# Patient Record
Sex: MALE | Race: WHITE | Employment: OTHER | URBAN - METROPOLITAN AREA
[De-identification: names, ages, dates, MRNs, and addresses within clinical notes are randomized per-mention and may not be internally consistent; named-entity substitution may affect disease eponyms.]

---

## 2017-08-31 ENCOUNTER — HOSPITAL ENCOUNTER (EMERGENCY)
Facility: CLINIC | Age: 62
Discharge: HOME | End: 2017-09-01
Attending: EMERGENCY MEDICINE | Admitting: EMERGENCY MEDICINE
Payer: COMMERCIAL

## 2017-08-31 DIAGNOSIS — R33.9 URINARY RETENTION: ICD-10-CM

## 2017-08-31 PROCEDURE — 51702 INSERT TEMP BLADDER CATH: CPT

## 2017-08-31 PROCEDURE — 99283 EMERGENCY DEPT VISIT LOW MDM: CPT

## 2017-08-31 NOTE — ED AVS SNAPSHOT
Emergency Department    [address] [facility] [address]    Phone:  [phone]    Fax:  [phone]                                       Trevon Nice   MRN: [id number]    Department:   Emergency Department   Date of Visit:  8/31/2017           Patient Information     Date Of Birth          1955        Your diagnoses for this visit were:     Urinary retention        You were seen by Gino Chan DO.      Follow-up Information     Follow up with Urologist In 5 days.    Why:  Call tomorrow for appointment        Follow up with  Emergency Department.    Specialty:  EMERGENCY MEDICINE    Why:  If symptoms worsen    Contact information:    [address] [facility] [address] [phone]        Discharge Instructions         Urinary Retention (Male)  Urinary retention is the medical term for difficulty or inability to pass urine, even though your bladder is full.  Causes  The most common cause of urinary retention in men is the bladder outlet being blocked. This can be due to an enlarged prostate gland or a bladder infection. Certain medicines can also cause this problem. This condition is more likely to occur as men get older.    This condition is treated by insertion of a catheter into the bladder to drain the urine. This provides immediate relief. The catheter may need to remain in place for a few days to prevent a recurrence. The catheter has a balloon on the tip which was inflated after insertion. This prevents the catheter from falling out.  Symptoms  Common symptoms of urinary retention include:    Pain (not experienced by everyone)    Frequent urination    Feeling that the bladder is still full after urinating    Incontinence (not being able to control the release of urine)    Swollen abdomen  Treatment  This condition is treated by inserting a tube (catheter) into the bladder to drain the urine. This provides immediate relief. The catheter  may need to stay in place for a few days. The catheter has a balloon on the tip, which is inflated after insertion. This prevents the catheter from falling out.  Home care    If you were given antibiotics, take them until they are used up, or your healthcare provider tells you to stop. It is important to finish the antibiotics even though you feel better. This is to make sure your infection has cleared.    If a catheter was left in place, it is important to keep bacteria from getting into the collection bag. Do not disconnect the catheter from the collection bag.    Use a leg band to secure the drainage tube, so it does not pull on the catheter. Drain the collection bag when it becomes full using the drain spout at the bottom of the bag.    Do not pull on or try to remove your catheter. This will injure your urethra. The catheter must be removed by a healthcare provider.  Follow-up care  Follow up with your healthcare provider, or as advised.  If a catheter was left in place, it can usually be removed within 3 to 7 days. Some conditions require the catheter to stay in longer. Your healthcare provider will tell you when to return to have the catheter removed.  When to seek medical advice  Call your healthcare provider right away if any of these occur:    Fever of 100.4 F (38 C) or higher, or as directed by your healthcare provider    Bladder or lower-abdominal pain or fullness    Abdominal swelling, nausea, vomiting, or back pain    Blood or urine leakage around the catheter    Bloody urine coming from the catheter (if a new symptom)    Weakness, dizziness, or fainting    Confusion or change in usual level of alertness    If a catheter was left in place, return if:    Catheter falls out    Catheter stops draining for 6 hours  Date Last Reviewed: 7/26/2015 2000-2017 [employer]. [address] [place] [address]. All rights reserved. This information is not intended as a substitute for  professional medical care. Always follow your healthcare professional's instructions.          24 Hour Appointment Hotline       To make an appointment at any [facility], call [phone] ([phone]). If you don't have a family doctor or clinic, we will help you find one. [place] clinics are conveniently located to serve the needs of you and your family.             Review of your medicines      Our records show that you are taking the medicines listed below. If these are incorrect, please call your family doctor or clinic.        Dose / Directions Last dose taken    METOPROLOL TARTRATE PO   Dose:  25 mg        Take 25 mg by mouth   Refills:  0        TAMBOCOR PO   Dose:  50 mg        Take 50 mg by mouth   Refills:  0        TAMSULOSIN HCL PO   Dose:  0.4 mg        Take 0.4 mg by mouth   Refills:  0                Procedures and tests performed during your visit     Bladder scan    UA reflex to Microscopic      Orders Needing Specimen Collection     None      Pending Results     No orders found for last 3 day(s).            Pending Culture Results     No orders found for last 3 day(s).            Pending Results Instructions     If you had any lab results that were not finalized at the time of your Discharge, you can call the ED Lab Result RN at [phone]. You will be contacted by this team for any positive Lab results or changes in treatment. The nurses are available 7 days a week from 10A to 6:30P.  You can leave a message 24 hours per day and they will return your call.        Test Results From Your Hospital Stay        9/1/2017 12:25 AM      Component Results     Component Value Ref Range & Units Status    Color Urine Straw  Final    Appearance Urine Clear  Final    Glucose Urine Negative NEG^Negative mg/dL Final    Bilirubin Urine Negative NEG^Negative Final    Ketones Urine Negative NEG^Negative mg/dL Final    Specific Gravity Urine 1.002 (L) 1.003 - 1.035 Final    Blood Urine Trace (A)  NEG^Negative Final    pH Urine 5.5 5.0 - 7.0 pH Final    Protein Albumin Urine Negative NEG^Negative mg/dL Final    Urobilinogen mg/dL Normal 0.0 - 2.0 mg/dL Final    Nitrite Urine Negative NEG^Negative Final    Leukocyte Esterase Urine Negative NEG^Negative Final    Source Catheterized Urine  Final    RBC Urine 0 0 - 2 /HPF Final    WBC Urine 0 0 - 2 /HPF Final                Clinical Quality Measure: Blood Pressure Screening     Your blood pressure was checked while you were in the emergency department today. The last reading we obtained was  BP: (!) 147/96 . Please read the guidelines below about what these numbers mean and what you should do about them.  If your systolic blood pressure (the top number) is less than 120 and your diastolic blood pressure (the bottom number) is less than 80, then your blood pressure is normal. There is nothing more that you need to do about it.  If your systolic blood pressure (the top number) is 120-139 or your diastolic blood pressure (the bottom number) is 80-89, your blood pressure may be higher than it should be. You should have your blood pressure rechecked within a year by a primary care provider.  If your systolic blood pressure (the top number) is 140 or greater or your diastolic blood pressure (the bottom number) is 90 or greater, you may have high blood pressure. High blood pressure is treatable, but if left untreated over time it can put you at risk for heart attack, stroke, or kidney failure. You should have your blood pressure rechecked by a primary care provider within the next 4 weeks.  If your provider in the emergency department today gave you specific instructions to follow-up with your doctor or provider even sooner than that, you should follow that instruction and not wait for up to 4 weeks for your follow-up visit.        Thank you for choosing [facility]       Thank you for choosing [facility] for your care. Our goal is always to provide you with excellent care.  Hearing back from our patients is one way we can continue to improve our services. Please take a few minutes to complete the written survey that you may receive in the mail after you visit with us. Thank you!        [employer] Information     [employer] gives you secure access to your electronic health record. If you see a primary care provider, you can also send messages to your care team and make appointments. If you have questions, please call your primary care clinic.  If you do not have a primary care provider, please call [phone] and they will assist you.        Care EveryWhere ID     This is your Care EveryWhere ID. This could be used by other organizations to access your [facility] medical records  [id number]        Equal Access to Services     NGA BERGMAN : Eva Adame, michael muñoz, benny snowden, camilo hurd. So [facility] [phone].    ATENCIÓN: Si habla español, tiene a benoit disposición servicios gratuitos de asistencia lingüística. Llame al [phone].    We comply with applicable federal civil rights laws and [place] laws. We do not discriminate on the basis of race, color, national origin, age, disability sex, sexual orientation or gender identity.            After Visit Summary       This is your record. Keep this with you and show to your community pharmacist(s) and doctor(s) at your next visit.

## 2017-08-31 NOTE — ED AVS SNAPSHOT
Emergency Department    [address] [place] [address]    Phone:  [phone]    Fax:  [phone]                                       Trevon Nice   MRN: [id number]    Department:   Emergency Department   Date of Visit:  8/31/2017           After Visit Summary Signature Page     I have received my discharge instructions, and my questions have been answered. I have discussed any challenges I see with this plan with the nurse or doctor.    ..........................................................................................................................................  Patient/Patient Representative Signature      ..........................................................................................................................................  Patient Representative Print Name and Relationship to Patient    ..................................................               ................................................  Date                                            Time    ..........................................................................................................................................  Reviewed by Signature/Title    ...................................................              ..............................................  Date                                                            Time

## 2017-09-01 VITALS
WEIGHT: 150 LBS | BODY MASS INDEX: 23.54 KG/M2 | RESPIRATION RATE: 18 BRPM | TEMPERATURE: 98 F | HEIGHT: 67 IN | DIASTOLIC BLOOD PRESSURE: 96 MMHG | OXYGEN SATURATION: 100 % | SYSTOLIC BLOOD PRESSURE: 147 MMHG | HEART RATE: 62 BPM

## 2017-09-01 LAB
ALBUMIN UR-MCNC: NEGATIVE MG/DL
APPEARANCE UR: CLEAR
BILIRUB UR QL STRIP: NEGATIVE
COLOR UR AUTO: ABNORMAL
GLUCOSE UR STRIP-MCNC: NEGATIVE MG/DL
HGB UR QL STRIP: ABNORMAL
KETONES UR STRIP-MCNC: NEGATIVE MG/DL
LEUKOCYTE ESTERASE UR QL STRIP: NEGATIVE
NITRATE UR QL: NEGATIVE
PH UR STRIP: 5.5 PH (ref 5–7)
RBC #/AREA URNS AUTO: 0 /HPF (ref 0–2)
SOURCE: ABNORMAL
SP GR UR STRIP: 1 (ref 1–1.03)
UROBILINOGEN UR STRIP-MCNC: NORMAL MG/DL (ref 0–2)
WBC #/AREA URNS AUTO: 0 /HPF (ref 0–2)

## 2017-09-01 PROCEDURE — 81001 URINALYSIS AUTO W/SCOPE: CPT | Performed by: EMERGENCY MEDICINE

## 2017-09-01 ASSESSMENT — ENCOUNTER SYMPTOMS
SHORTNESS OF BREATH: 0
DIFFICULTY URINATING: 1
FEVER: 0
ROS GI COMMENTS: POSITIVE FOR ABDOMINAL DISCOMFORT.

## 2017-09-01 NOTE — DISCHARGE INSTRUCTIONS
Urinary Retention (Male)  Urinary retention is the medical term for difficulty or inability to pass urine, even though your bladder is full.  Causes  The most common cause of urinary retention in men is the bladder outlet being blocked. This can be due to an enlarged prostate gland or a bladder infection. Certain medicines can also cause this problem. This condition is more likely to occur as men get older.    This condition is treated by insertion of a catheter into the bladder to drain the urine. This provides immediate relief. The catheter may need to remain in place for a few days to prevent a recurrence. The catheter has a balloon on the tip which was inflated after insertion. This prevents the catheter from falling out.  Symptoms  Common symptoms of urinary retention include:    Pain (not experienced by everyone)    Frequent urination    Feeling that the bladder is still full after urinating    Incontinence (not being able to control the release of urine)    Swollen abdomen  Treatment  This condition is treated by inserting a tube (catheter) into the bladder to drain the urine. This provides immediate relief. The catheter may need to stay in place for a few days. The catheter has a balloon on the tip, which is inflated after insertion. This prevents the catheter from falling out.  Home care    If you were given antibiotics, take them until they are used up, or your healthcare provider tells you to stop. It is important to finish the antibiotics even though you feel better. This is to make sure your infection has cleared.    If a catheter was left in place, it is important to keep bacteria from getting into the collection bag. Do not disconnect the catheter from the collection bag.    Use a leg band to secure the drainage tube, so it does not pull on the catheter. Drain the collection bag when it becomes full using the drain spout at the bottom of the bag.    Do not pull on or try to remove your catheter.  This will injure your urethra. The catheter must be removed by a healthcare provider.  Follow-up care  Follow up with your healthcare provider, or as advised.  If a catheter was left in place, it can usually be removed within 3 to 7 days. Some conditions require the catheter to stay in longer. Your healthcare provider will tell you when to return to have the catheter removed.  When to seek medical advice  Call your healthcare provider right away if any of these occur:    Fever of 100.4 F (38 C) or higher, or as directed by your healthcare provider    Bladder or lower-abdominal pain or fullness    Abdominal swelling, nausea, vomiting, or back pain    Blood or urine leakage around the catheter    Bloody urine coming from the catheter (if a new symptom)    Weakness, dizziness, or fainting    Confusion or change in usual level of alertness    If a catheter was left in place, return if:    Catheter falls out    Catheter stops draining for 6 hours  Date Last Reviewed: 7/26/2015 2000-2017 [employer]. [address] [place] [address]. All rights reserved. This information is not intended as a substitute for professional medical care. Always follow your healthcare professional's instructions.

## 2017-09-01 NOTE — ED PROVIDER NOTES
"  History     Chief Complaint:  Urinary Retention    HPI   Trevon Nice is a 61 year old male who presents with urinary retention. Patient reports that he has a history of an enlarged prostate, and takes Flomax for this. He states that he had been urinating more frequently than usual earlier today, though for the past 1.5 hours has been unable to void. He has some discomfort in his lower abdomen, which he rates 5/10. No trauma to the groin or back. Patient is not on blood thinners. Denies chest pain, shortness of breath, or fevers.    Allergies:  The patient has no known drug allergies.    Medications:    Tamsulosin HCl  Metoprolol tartrate  Tambocor    Past Medical History:    HTN  Atrial fibrillation  Prostatitis    Past Surgical History:    History reviewed.  No significant past surgical history.    Family History:    History reviewed.  No significant family history.    Social History:  Relationship status:   Tobacco use: Negative  Alcohol use: Positive  The patient presents with his son.     Review of Systems   Constitutional: Negative for fever.   Respiratory: Negative for shortness of breath.    Cardiovascular: Negative for chest pain.   Gastrointestinal:        Positive for abdominal discomfort.   Genitourinary: Positive for difficulty urinating.   All other systems reviewed and are negative.      Physical Exam   First Vitals:  BP: 147/96  Temp: 98  F (36.7  C)  Temp src: Temporal  Pulse: 62  Resp: 18  SpO2: 100 %  Height: 170.2 cm (5' 7\")  Weight: 68 kg (150 lb)    Physical Exam  General: Alert and cooperative with exam. Patient in mild distress. Normal mentation.  Head:  Scalp is NC/AT  Eyes:  No scleral icterus, PERRL   ENT:  The external nose and ears are normal. The oropharynx is normal and without erythema; mucus membranes are moist. Uvula midline, no evidence of deep space infection.  Neck:  Normal range of motion without rigidity.  CV:  Regular rate and rhythm    No pathologic murmur "   Resp:  Breath sounds are clear bilaterally    Non-labored, no retractions or accessory muscle use  GI:  Abdomen is soft, no distension, mild lower abdominal tenderness. No peritoneal signs  :  Normal external male genitalia. No evidence of infection. Goodson in place.  MS:  No lower extremity edema   Skin:  Warm and dry, No rash or lesions noted.  Neuro: Oriented x 3. No gross motor deficits.      Emergency Department Course     Laboratory:  UA: Clear, straw urine: Specific gravity 1.002 (L), Blood trace (A), o/w WNL    Emergency Department Course:  Nursing notes and vitals reviewed.  I performed an exam of the patient as documented above.  The above workup was undertaken.  0048: I rechecked the patient and discussed results.    Findings and plan explained to the Patient. Patient discharged home, status improved, with instructions regarding supportive care, medications, and reasons to return as well as the importance of close follow-up was reviewed.      Impression & Plan      Medical Decision Making:  Trevon Nice is a 61 year old male who presents for evaluation of abdominal pain and decreased urinary output.  I considered a broad differential including diverticulitis, aneurysm, urinary retention, ureterolithiasis, UTI, pyelonephritis, neurologic causes (MS, cauda equina,etc), colitis, etc.  The history and exam are consistent with acute urinary retention and this is confirmed after goodson catheter placement; >800 cc output.  A urinalysis was obtained and was unremarkable.  Will send home with catheter and have patient follow up with urology in 3-5 days.  The cause of the acute urinary retention is unclear at this point.  Medications for discharge noted above.  Patient is stable for discharge home.      Diagnosis:    ICD-10-CM   1. Urinary retention R33.9     Disposition:  Discharge to home with primary care and urology follow up.     Sumit LYON, am serving as a scribe on 8/31/2017 at 11:59 PM to  personally document services performed by Gino Chan DO, based on my observations and the provider's statements to me.     EMERGENCY DEPARTMENT       Gino Chan DO  09/01/17 0326

## 2018-05-25 ENCOUNTER — HOSPITAL ENCOUNTER (EMERGENCY)
Facility: CLINIC | Age: 63
Discharge: HOME | End: 2018-05-25
Attending: EMERGENCY MEDICINE | Admitting: EMERGENCY MEDICINE
Payer: COMMERCIAL

## 2018-05-25 VITALS
BODY MASS INDEX: 24.33 KG/M2 | OXYGEN SATURATION: 97 % | RESPIRATION RATE: 16 BRPM | TEMPERATURE: 98.5 F | HEIGHT: 67 IN | HEART RATE: 63 BPM | WEIGHT: 155 LBS | SYSTOLIC BLOOD PRESSURE: 105 MMHG | DIASTOLIC BLOOD PRESSURE: 57 MMHG

## 2018-05-25 DIAGNOSIS — I48.91 ATRIAL FIBRILLATION WITH RAPID VENTRICULAR RESPONSE (H): ICD-10-CM

## 2018-05-25 LAB
ANION GAP SERPL CALCULATED.3IONS-SCNC: 6 MMOL/L (ref 3–14)
BASOPHILS # BLD AUTO: 0 10E9/L (ref 0–0.2)
BASOPHILS NFR BLD AUTO: 0.1 %
BUN SERPL-MCNC: 24 MG/DL (ref 7–30)
CALCIUM SERPL-MCNC: 8.6 MG/DL (ref 8.5–10.1)
CHLORIDE SERPL-SCNC: 104 MMOL/L (ref 94–109)
CO2 SERPL-SCNC: 26 MMOL/L (ref 20–32)
CREAT SERPL-MCNC: 1.45 MG/DL (ref 0.66–1.25)
DIFFERENTIAL METHOD BLD: ABNORMAL
EOSINOPHIL # BLD AUTO: 0 10E9/L (ref 0–0.7)
EOSINOPHIL NFR BLD AUTO: 0 %
ERYTHROCYTE [DISTWIDTH] IN BLOOD BY AUTOMATED COUNT: 12.5 % (ref 10–15)
GFR SERPL CREATININE-BSD FRML MDRD: 49 ML/MIN/1.7M2
GLUCOSE SERPL-MCNC: 111 MG/DL (ref 70–99)
HCT VFR BLD AUTO: 42.8 % (ref 40–53)
HGB BLD-MCNC: 14.9 G/DL (ref 13.3–17.7)
IMM GRANULOCYTES # BLD: 0.1 10E9/L (ref 0–0.4)
IMM GRANULOCYTES NFR BLD: 0.3 %
INTERPRETATION ECG - MUSE: NORMAL
INTERPRETATION ECG - MUSE: NORMAL
LYMPHOCYTES # BLD AUTO: 1.9 10E9/L (ref 0.8–5.3)
LYMPHOCYTES NFR BLD AUTO: 11.2 %
MCH RBC QN AUTO: 30.7 PG (ref 26.5–33)
MCHC RBC AUTO-ENTMCNC: 34.8 G/DL (ref 31.5–36.5)
MCV RBC AUTO: 88 FL (ref 78–100)
MONOCYTES # BLD AUTO: 0.8 10E9/L (ref 0–1.3)
MONOCYTES NFR BLD AUTO: 4.6 %
NEUTROPHILS # BLD AUTO: 14 10E9/L (ref 1.6–8.3)
NEUTROPHILS NFR BLD AUTO: 83.8 %
NRBC # BLD AUTO: 0 10*3/UL
NRBC BLD AUTO-RTO: 0 /100
PLATELET # BLD AUTO: 193 10E9/L (ref 150–450)
POTASSIUM SERPL-SCNC: 4.1 MMOL/L (ref 3.4–5.3)
RBC # BLD AUTO: 4.86 10E12/L (ref 4.4–5.9)
SODIUM SERPL-SCNC: 136 MMOL/L (ref 133–144)
WBC # BLD AUTO: 16.6 10E9/L (ref 4–11)

## 2018-05-25 PROCEDURE — 99285 EMERGENCY DEPT VISIT HI MDM: CPT

## 2018-05-25 PROCEDURE — 93005 ELECTROCARDIOGRAM TRACING: CPT

## 2018-05-25 PROCEDURE — 85025 COMPLETE CBC W/AUTO DIFF WBC: CPT | Performed by: EMERGENCY MEDICINE

## 2018-05-25 PROCEDURE — 93005 ELECTROCARDIOGRAM TRACING: CPT | Mod: 76

## 2018-05-25 PROCEDURE — 80048 BASIC METABOLIC PNL TOTAL CA: CPT | Performed by: EMERGENCY MEDICINE

## 2018-05-25 RX ORDER — METOPROLOL TARTRATE 1 MG/ML
5 INJECTION, SOLUTION INTRAVENOUS
Status: ACTIVE | OUTPATIENT
Start: 2018-05-25 | End: 2018-05-25

## 2018-05-25 NOTE — ED PROVIDER NOTES
"  History     Chief Complaint:  \"my AFib\"    HPI   Trevon Nice is a 62 year old male, with a history of hypertension and atrial fibrillation, who presents via EMS with generalized weakness. The patient recently had his prostate removed due to enlargement under general anesthesia 2 days ago, but does not currently have a catheter placed. Today, the patient states that he had slept in this morning due to feeling tired. When he got up, he noticed that he just felt generalized weakness which is consistent with his past presentation of atrial fibrillation, prompting EMS. Here, the patient states that he is feeling better. He states that he has not required cardioversion in the past for his atrial fibrillation. The patient follows with cardiology for his atrial fibrillation and is prescribed Flecainide 50mg daily and Metoprolol 25mg daily. He had taken these medications prior to arrival.  No syncope and no new pain.  He has had no trouble urinating and does not think there is any structural postoperative complication.      Allergies:  NKDA     Medications:    Tambocor  Metoprolol  Tamsulosin    Past Medical History:    Hypertension  Prostatitis    Past Surgical History:    The patient does not have any pertinent past surgical history.     Family History:    No past pertinent family history.     Social History:  Presents via EMS.  Positive for alcohol use.   Negative for tobacco use.   Marital Status:   [2]     Review of Systems   All other systems reviewed and are negative.      Physical Exam     Patient Vitals for the past 24 hrs:   BP Temp Temp src Pulse Resp SpO2 Height Weight   05/25/18 1331 105/57 - - 63 16 - - -   05/25/18 1135 116/89 98.5  F (36.9  C) Oral 132 16 97 % 1.702 m (5' 7\") 70.3 kg (155 lb)         Physical Exam  General: male sitting upright in room 29  HENT: mucous membranes moist, thyroid nonpalpable  CV: tachycardic rate, initially irregular rhythm, no lower extremity edema, no JVD, " palpable symmetric radial pulses, no murmur audible  Resp: clear throughout, normal effort, no crackles or wheezing  GI: abdomen soft and nontender  MSK: no bony tenderness   Skin: appropriately warm and dry  Neuro: alert, clear speech, oriented, face symmetric,  normal  Psych: normal mood and affect      Emergency Department Course   ECG:  Indication: generalized weakness, irregular rhythm  Time: 1134  Vent. Rate 115 bpm. CO interval *. QRS duration 90. QT/QTc 306/423. P-R-T axis * -17 -35. atrial fibrillation with rapid fibrillation with rapid ventricular response. Nonspecific ST abnormality. Abnormal ECG. Read time: 1139.      Indication: repeat  Time: 1242  Vent. Rate 65 bpm. CO interval 170. QRS duration 98. QT/QTc 370/384. P-R-T axis 45 28 21. Sinus rhythm. Read time: 1258     Laboratory:  CBC: WBC: 16.6 (H), HGB: 14.9, PLT: 193  BMP: Glucose 111 (H), Creatinine: 1.45 (H), GFR: 49 (L), o/w WNL     Emergency Department Course:  Nursing notes and vitals reviewed. EKG was obtained, findings above.      1138  I performed an exam of the patient as documented above.     Blood drawn. This was sent to the lab for further testing, results above.    The patient was placed on continuous cardiac and pulse ox monitoring.      1233 I consulted with Dr. Maddy Pride, cardiology, regarding the patient's history and presentation here in the emergency department.     1236 The patient had converted as seen on the monitor.     1300 I rechecked the patient and discussed the results of his workup thus far.     Findings and plan explained to the Patient. Patient discharged home with instructions regarding supportive care, medications, and reasons to return. The importance of close follow-up was reviewed.     I personally reviewed the laboratory results with the Patient and answered all related questions prior to discharge.       Impression & Plan      Medical Decision Making:  While he does have atrial fibrillation with RVR,  which is presumably newly recurrent since his prostate surgery under general anesthesia a few days ago (I highly doubt they would have proceeded with this fairly elective procedure if he had had abnormal vitals or worrisome cardiac rhythms), the fact that he felt completely improved when sitting in bed while he still had A. fib with RVR raised significant question about the duration of his rhythm and I therefore did not feel comfortable performing an electrical cardioversion in this asymptomatic gentleman.  Fortunately, he happened to spontaneously cardiovert himself before we could give any rate controlling medications and just moments after I spoke with his cardiologist through [facility].  He is asymptomatic with normalized vital signs.  I think this is simply an adverse effect from his recent surgery, though not due to a dangerous complication such as postoperative PE or infection.  He did not wish for me to examine his genitals, and given an absence of genitourinary symptoms I felt this could be reasonably deferred.  He should follow-up soon through his cardiology, urology, and primary care teams and return here for unexpected deterioration.  He should continue his metoprolol and flecainide as he already is.  He is not currently a candidate for anticoagulation based on his CHADs-Vasc score, especially given his very recent surgery.    Diagnosis:    ICD-10-CM   1. Atrial fibrillation with rapid ventricular response (H) I48.91   2.      Status post prostatectomy      Disposition:  discharged to home    IMasha, am serving as a scribe on 5/25/2018 at 11:38 AM to personally document services performed by Immanuel Christie MD based on my observations and the provider's statements to me.      Masha Kevin  5/25/2018    EMERGENCY DEPARTMENT       Immanuel Christie MD  05/25/18 6110

## 2018-05-25 NOTE — ED AVS SNAPSHOT
Emergency Department    [address] [facility] [address]    Phone:  [phone]    Fax:  [phone]                                       Trevon Nice   MRN: [id number]    Department:   Emergency Department   Date of Visit:  5/25/2018           Patient Information     Date Of Birth          1955        Your diagnoses for this visit were:     Atrial fibrillation with rapid ventricular response (H)        You were seen by Immanuel Christie MD.      Follow-up Information     Call Maddy Shanks MD.    Specialty:  Internal Medicine    Contact information:    [facility]  [address]  [place] [address] [phone]          Call Sienna La.    Specialty:  Family Practice    Contact information:    [facility]  [address] [facility] [address] [phone]          Follow up with  Emergency Department.    Specialty:  EMERGENCY MEDICINE    Why:  As needed, If symptoms worsen    Contact information:    [address] [facility] [id number] [phone]        Discharge Instructions         Discharge Instructions for Atrial Fibrillation  You have been diagnosed with an abnormal heart rhythm called atrial fibrillation. With this condition, your heart s 2 upper chambers quiver rather than squeeze the blood out in a normal pattern. This leads to an irregular and sometimes rapid heartbeat. Some people will develop associated symptoms such as a flip-flopping heartbeat, chest pain, lightheadedness, or shortness of breath. Other people may have no symptoms at all. Atrial fibrillation is serious because it affects the heart s ability to fill with blood as it should. Blood clots may form. This increases the risk for stroke. Untreated atrial fibrillation can also lead to heart failure. Atrial fibrillation can be controlled. With treatment, most people with atrial fibrillation lead normal lives.  Treatment  options  Recommended treatment for atrial fibrillation depends on your age, symptoms, how long you have had atrial fibrillation, and other factors. You will have a complete evaluation to find out if you have any abnormalities that caused your heart to go into atrial fibrillation. This might be blocked heart arteries or a thyroid problem. Your doctor will assess your particular case and discuss choices with you.  Treatment choices may include:    Treating an underlying disorder that puts you at risk for atrial fibrillation. For example, correcting an abnormal thyroid or electrolyte problem, or treating a blocked heart artery.    Restoring a normal heart rhythm with an electrical shock (cardioversion) or with an antiarrhythmic medicine (chemical cardioversion)    Using medicine to control your heart rate in atrial fibrillation.    Preventing the risk for blood clot and stroke using blood-thinning medicines. Your doctor will tell you what he or she recommends. Choices may include aspirin, clopidogrel, warfarin, dabigatran, rivaroxaban, apixaban, and edoxaban.    Doing catheter ablation or a surgical maze procedure. These use different methods to destroy certain areas of heart tissue. This interrupts the electrical signals causing atrial fibrillation. One of these procedures may be a choice when medicines do not work, or as an alternative to long-term medicine.    Other treatment choices may be recommended for you by your doctor.  Managing risk factors for stroke and preventing heart failure are important parts of any treatment plan for atrial fibrillation.  Home care    Take your medicines exactly as directed. Don t skip doses.    Work with your doctor to find the right medicines and doses for you.    Learn to take your own pulse. Keep a record of your results. Ask your doctor which pulse rates mean that you need medical attention. Slowing your pulse is often the goal of treatment. Ask your doctor if it s OK for you to  use an automatic machine to check your pulse at home. Sometimes these machines don t count the pulse correctly when you have atrial fibrillation.    Limit your intake of coffee, tea, cola, and other beverages with caffeine. Talk with your doctor about whether you should eliminate caffeine.    Avoid over-the-counter medicines that have caffeine in them.    Let your doctor know what medicines you take, including prescription and over-the-counter medicines, as well as any supplements. They interfere with some medicines given for atrial fibrillation.    Ask your doctor about whether you can drink alcohol. Some people need to avoid alcohol to better treat atrial fibrillation. If you are taking blood-thinner medicines, alcohol may interfere with them by increasing their effect.    Never take stimulants such as amphetamines or cocaine. These drugs can speed up your heart rate and trigger atrial fibrillation.  Follow-up care  Follow up with your doctor, or as advised.     When should I call my healthcare provider  Call your healthcare provider right away if you have any of the following:    Weakness    Dizziness    Fainting    Fatigue    Shortness of breath    Chest pain with increased activity    A change in the usual regularity of your heartbeat, or an unusually fast heartbeat   Date Last Reviewed: 4/23/2016 2000-2017 [employer]. [address] [place], [place] [address]. All rights reserved. This information is not intended as a substitute for professional medical care. Always follow your healthcare professional's instructions.          24 Hour Appointment Hotline       To make an appointment at any [facility], call [phone] ([phone]). If you don't have a family doctor or clinic, we will help you find one. [facility] are conveniently located to serve the needs of you and your family.             Review of your medicines      Our records show that you are taking the medicines  listed below. If these are incorrect, please call your family doctor or clinic.        Dose / Directions Last dose taken    METOPROLOL TARTRATE PO   Dose:  25 mg        Take 25 mg by mouth   Refills:  0        TAMBOCOR PO   Dose:  50 mg        Take 50 mg by mouth   Refills:  0        TAMSULOSIN HCL PO   Dose:  0.4 mg        Take 0.4 mg by mouth   Refills:  0                Procedures and tests performed during your visit     Basic metabolic panel    CBC with platelets differential    Cardiac Continuous Monitoring    EKG 12 lead    EKG 12-lead, tracing only    Peripheral IV catheter      Orders Needing Specimen Collection     None      Pending Results     No orders found from 5/23/2018 to 5/26/2018.            Pending Culture Results     No orders found from 5/23/2018 to 5/26/2018.            Pending Results Instructions     If you had any lab results that were not finalized at the time of your Discharge, you can call the ED Lab Result RN at [phone]. You will be contacted by this team for any positive Lab results or changes in treatment. The nurses are available 7 days a week from 10A to 6:30P.  You can leave a message 24 hours per day and they will return your call.        Test Results From Your Hospital Stay        5/25/2018 11:57 AM      Component Results     Component Value Ref Range & Units Status    WBC 16.6 (H) 4.0 - 11.0 10e9/L Final    RBC Count 4.86 4.4 - 5.9 10e12/L Final    Hemoglobin 14.9 13.3 - 17.7 g/dL Final    Hematocrit 42.8 40.0 - 53.0 % Final    MCV 88 78 - 100 fl Final    MCH 30.7 26.5 - 33.0 pg Final    MCHC 34.8 31.5 - 36.5 g/dL Final    RDW 12.5 10.0 - 15.0 % Final    Platelet Count 193 150 - 450 10e9/L Final    Diff Method Automated Method  Final    % Neutrophils 83.8 % Final    % Lymphocytes 11.2 % Final    % Monocytes 4.6 % Final    % Eosinophils 0.0 % Final    % Basophils 0.1 % Final    % Immature Granulocytes 0.3 % Final    Nucleated RBCs 0 0 /100 Final    Absolute Neutrophil 14.0  (H) 1.6 - 8.3 10e9/L Final    Absolute Lymphocytes 1.9 0.8 - 5.3 10e9/L Final    Absolute Monocytes 0.8 0.0 - 1.3 10e9/L Final    Absolute Eosinophils 0.0 0.0 - 0.7 10e9/L Final    Absolute Basophils 0.0 0.0 - 0.2 10e9/L Final    Abs Immature Granulocytes 0.1 0 - 0.4 10e9/L Final    Absolute Nucleated RBC 0.0  Final         5/25/2018 12:21 PM      Component Results     Component Value Ref Range & Units Status    Sodium 136 133 - 144 mmol/L Final    Potassium 4.1 3.4 - 5.3 mmol/L Final    Chloride 104 94 - 109 mmol/L Final    Carbon Dioxide 26 20 - 32 mmol/L Final    Anion Gap 6 3 - 14 mmol/L Final    Glucose 111 (H) 70 - 99 mg/dL Final    Urea Nitrogen 24 7 - 30 mg/dL Final    Creatinine 1.45 (H) 0.66 - 1.25 mg/dL Final    GFR Estimate 49 (L) >60 mL/min/1.7m2 Final    Non  GFR Calc    GFR Estimate If Black 59 (L) >60 mL/min/1.7m2 Final    African American GFR Calc    Calcium 8.6 8.5 - 10.1 mg/dL Final                Clinical Quality Measure: Blood Pressure Screening     Your blood pressure was checked while you were in the emergency department today. The last reading we obtained was  BP: 116/89 . Please read the guidelines below about what these numbers mean and what you should do about them.  If your systolic blood pressure (the top number) is less than 120 and your diastolic blood pressure (the bottom number) is less than 80, then your blood pressure is normal. There is nothing more that you need to do about it.  If your systolic blood pressure (the top number) is 120-139 or your diastolic blood pressure (the bottom number) is 80-89, your blood pressure may be higher than it should be. You should have your blood pressure rechecked within a year by a primary care provider.  If your systolic blood pressure (the top number) is 140 or greater or your diastolic blood pressure (the bottom number) is 90 or greater, you may have high blood pressure. High blood pressure is treatable, but if left untreated  over time it can put you at risk for heart attack, stroke, or kidney failure. You should have your blood pressure rechecked by a primary care provider within the next 4 weeks.  If your provider in the emergency department today gave you specific instructions to follow-up with your doctor or provider even sooner than that, you should follow that instruction and not wait for up to 4 weeks for your follow-up visit.        Thank you for choosing [facility]       Thank you for choosing [facility] for your care. Our goal is always to provide you with excellent care. Hearing back from our patients is one way we can continue to improve our services. Please take a few minutes to complete the written survey that you may receive in the mail after you visit with us. Thank you!        [employer] Information     [employer] gives you secure access to your electronic health record. If you see a primary care provider, you can also send messages to your care team and make appointments. If you have questions, please call your primary care clinic.  If you do not have a primary care provider, please call [phone] and they will assist you.        Care EveryWhere ID     This is your Care EveryWhere ID. This could be used by other organizations to access your [facility] medical records  [id number]        Equal Access to Services     NGA BERGMAN : Hadbharti Adame, michael muñoz, benny snowden, camilo hurd. So [facility] [phone].    ATENCIÓN: Si habla español, tiene a benoit disposición servicios gratuitos de asistencia lingüística. Llame al [phone].    We comply with applicable federal civil rights laws and [place] laws. We do not discriminate on the basis of race, color, national origin, age, disability, sex, sexual orientation, or gender identity.            After Visit Summary       This is your record. Keep this with you and show to your community pharmacist(s) and doctor(s) at your  next visit.

## 2018-05-25 NOTE — ED AVS SNAPSHOT
Emergency Department    [address] [place] [address]    Phone:  [phone]    Fax:  [phone]                                       Trevon Nice   MRN: [id number]    Department:   Emergency Department   Date of Visit:  5/25/2018           After Visit Summary Signature Page     I have received my discharge instructions, and my questions have been answered. I have discussed any challenges I see with this plan with the nurse or doctor.    ..........................................................................................................................................  Patient/Patient Representative Signature      ..........................................................................................................................................  Patient Representative Print Name and Relationship to Patient    ..................................................               ................................................  Date                                            Time    ..........................................................................................................................................  Reviewed by Signature/Title    ...................................................              ..............................................  Date                                                            Time

## 2018-05-25 NOTE — ED NOTES
Bed: ED29  Expected date: 5/25/18  Expected time: 11:21 AM  Means of arrival: Ambulance  Comments:  518 62M Afib [id number]  ETA [id number]

## 2018-05-25 NOTE — DISCHARGE INSTRUCTIONS
Discharge Instructions for Atrial Fibrillation  You have been diagnosed with an abnormal heart rhythm called atrial fibrillation. With this condition, your heart s 2 upper chambers quiver rather than squeeze the blood out in a normal pattern. This leads to an irregular and sometimes rapid heartbeat. Some people will develop associated symptoms such as a flip-flopping heartbeat, chest pain, lightheadedness, or shortness of breath. Other people may have no symptoms at all. Atrial fibrillation is serious because it affects the heart s ability to fill with blood as it should. Blood clots may form. This increases the risk for stroke. Untreated atrial fibrillation can also lead to heart failure. Atrial fibrillation can be controlled. With treatment, most people with atrial fibrillation lead normal lives.  Treatment options  Recommended treatment for atrial fibrillation depends on your age, symptoms, how long you have had atrial fibrillation, and other factors. You will have a complete evaluation to find out if you have any abnormalities that caused your heart to go into atrial fibrillation. This might be blocked heart arteries or a thyroid problem. Your doctor will assess your particular case and discuss choices with you.  Treatment choices may include:    Treating an underlying disorder that puts you at risk for atrial fibrillation. For example, correcting an abnormal thyroid or electrolyte problem, or treating a blocked heart artery.    Restoring a normal heart rhythm with an electrical shock (cardioversion) or with an antiarrhythmic medicine (chemical cardioversion)    Using medicine to control your heart rate in atrial fibrillation.    Preventing the risk for blood clot and stroke using blood-thinning medicines. Your doctor will tell you what he or she recommends. Choices may include aspirin, clopidogrel, warfarin, dabigatran, rivaroxaban, apixaban, and edoxaban.    Doing catheter ablation or a surgical maze  procedure. These use different methods to destroy certain areas of heart tissue. This interrupts the electrical signals causing atrial fibrillation. One of these procedures may be a choice when medicines do not work, or as an alternative to long-term medicine.    Other treatment choices may be recommended for you by your doctor.  Managing risk factors for stroke and preventing heart failure are important parts of any treatment plan for atrial fibrillation.  Home care    Take your medicines exactly as directed. Don t skip doses.    Work with your doctor to find the right medicines and doses for you.    Learn to take your own pulse. Keep a record of your results. Ask your doctor which pulse rates mean that you need medical attention. Slowing your pulse is often the goal of treatment. Ask your doctor if it s OK for you to use an automatic machine to check your pulse at home. Sometimes these machines don t count the pulse correctly when you have atrial fibrillation.    Limit your intake of coffee, tea, cola, and other beverages with caffeine. Talk with your doctor about whether you should eliminate caffeine.    Avoid over-the-counter medicines that have caffeine in them.    Let your doctor know what medicines you take, including prescription and over-the-counter medicines, as well as any supplements. They interfere with some medicines given for atrial fibrillation.    Ask your doctor about whether you can drink alcohol. Some people need to avoid alcohol to better treat atrial fibrillation. If you are taking blood-thinner medicines, alcohol may interfere with them by increasing their effect.    Never take stimulants such as amphetamines or cocaine. These drugs can speed up your heart rate and trigger atrial fibrillation.  Follow-up care  Follow up with your doctor, or as advised.     When should I call my healthcare provider  Call your healthcare provider right away if you have any of the  following:    Weakness    Dizziness    Fainting    Fatigue    Shortness of breath    Chest pain with increased activity    A change in the usual regularity of your heartbeat, or an unusually fast heartbeat   Date Last Reviewed: 4/23/2016 2000-2017 [employer] [address] [place] [address], [place] [address]. All rights reserved. This information is not intended as a substitute for professional medical care. Always follow your healthcare professional's instructions.

## 2020-02-10 ENCOUNTER — HEALTH MAINTENANCE LETTER (OUTPATIENT)
Age: 65
End: 2020-02-10

## 2024-06-28 ENCOUNTER — HOSPITAL ENCOUNTER (OUTPATIENT)
Facility: CLINIC | Age: 69
Setting detail: OBSERVATION
Discharge: HOME | End: 2024-06-29
Attending: EMERGENCY MEDICINE | Admitting: HOSPITALIST
Payer: COMMERCIAL

## 2024-06-28 ENCOUNTER — APPOINTMENT (OUTPATIENT)
Dept: CARDIOLOGY | Facility: CLINIC | Age: 69
End: 2024-06-28
Payer: COMMERCIAL

## 2024-06-28 DIAGNOSIS — U07.1 COVID-19: ICD-10-CM

## 2024-06-28 DIAGNOSIS — I48.92 ATRIAL FLUTTER WITH RAPID VENTRICULAR RESPONSE (H): ICD-10-CM

## 2024-06-28 LAB
ALBUMIN SERPL BCG-MCNC: 4.3 G/DL (ref 3.5–5.2)
ALP SERPL-CCNC: 83 U/L (ref 40–150)
ALT SERPL W P-5'-P-CCNC: 19 U/L (ref 0–70)
ANION GAP SERPL CALCULATED.3IONS-SCNC: 12 MMOL/L (ref 7–15)
APTT PPP: 32 SECONDS (ref 22–38)
AST SERPL W P-5'-P-CCNC: 15 U/L (ref 0–45)
ATRIAL RATE - MUSE: 294 BPM
BASOPHILS # BLD AUTO: 0 10E3/UL (ref 0–0.2)
BASOPHILS NFR BLD AUTO: 0 %
BILIRUB SERPL-MCNC: 0.7 MG/DL
BUN SERPL-MCNC: 17.5 MG/DL (ref 8–23)
CALCIUM SERPL-MCNC: 9.4 MG/DL (ref 8.8–10.2)
CHLORIDE SERPL-SCNC: 102 MMOL/L (ref 98–107)
CREAT SERPL-MCNC: 1.2 MG/DL (ref 0.67–1.17)
DEPRECATED HCO3 PLAS-SCNC: 23 MMOL/L (ref 22–29)
DIASTOLIC BLOOD PRESSURE - MUSE: NORMAL MMHG
EGFRCR SERPLBLD CKD-EPI 2021: 65 ML/MIN/1.73M2
EOSINOPHIL # BLD AUTO: 0.1 10E3/UL (ref 0–0.7)
EOSINOPHIL NFR BLD AUTO: 1 %
ERYTHROCYTE [DISTWIDTH] IN BLOOD BY AUTOMATED COUNT: 12.1 % (ref 10–15)
FLUAV RNA SPEC QL NAA+PROBE: NEGATIVE
FLUBV RNA RESP QL NAA+PROBE: NEGATIVE
GLUCOSE SERPL-MCNC: 105 MG/DL (ref 70–99)
GROUP A STREP BY PCR: NOT DETECTED
HCT VFR BLD AUTO: 47.5 % (ref 40–53)
HGB BLD-MCNC: 16.3 G/DL (ref 13.3–17.7)
HOLD SPECIMEN: NORMAL
HOLD SPECIMEN: NORMAL
IMM GRANULOCYTES # BLD: 0 10E3/UL
IMM GRANULOCYTES NFR BLD: 0 %
INR PPP: 1.11 (ref 0.85–1.15)
INTERPRETATION ECG - MUSE: NORMAL
LVEF ECHO: NORMAL
LYMPHOCYTES # BLD AUTO: 0.7 10E3/UL (ref 0.8–5.3)
LYMPHOCYTES NFR BLD AUTO: 7 %
MAGNESIUM SERPL-MCNC: 2 MG/DL (ref 1.7–2.3)
MCH RBC QN AUTO: 29.9 PG (ref 26.5–33)
MCHC RBC AUTO-ENTMCNC: 34.3 G/DL (ref 31.5–36.5)
MCV RBC AUTO: 87 FL (ref 78–100)
MONOCYTES # BLD AUTO: 1.2 10E3/UL (ref 0–1.3)
MONOCYTES NFR BLD AUTO: 13 %
NEUTROPHILS # BLD AUTO: 7.6 10E3/UL (ref 1.6–8.3)
NEUTROPHILS NFR BLD AUTO: 79 %
NRBC # BLD AUTO: 0 10E3/UL
NRBC BLD AUTO-RTO: 0 /100
P AXIS - MUSE: 75 DEGREES
PLATELET # BLD AUTO: 236 10E3/UL (ref 150–450)
POTASSIUM SERPL-SCNC: 4.3 MMOL/L (ref 3.4–5.3)
PR INTERVAL - MUSE: NORMAL MS
PROT SERPL-MCNC: 6.9 G/DL (ref 6.4–8.3)
QRS DURATION - MUSE: 84 MS
QT - MUSE: 340 MS
QTC - MUSE: 532 MS
R AXIS - MUSE: -43 DEGREES
RBC # BLD AUTO: 5.45 10E6/UL (ref 4.4–5.9)
RSV RNA SPEC NAA+PROBE: NEGATIVE
SARS-COV-2 RNA RESP QL NAA+PROBE: POSITIVE
SODIUM SERPL-SCNC: 137 MMOL/L (ref 135–145)
SYSTOLIC BLOOD PRESSURE - MUSE: NORMAL MMHG
T AXIS - MUSE: 90 DEGREES
TROPONIN T SERPL HS-MCNC: 13 NG/L
TROPONIN T SERPL HS-MCNC: 14 NG/L
TSH SERPL DL<=0.005 MIU/L-ACNC: 2.53 UIU/ML (ref 0.3–4.2)
VENTRICULAR RATE- MUSE: 147 BPM
WBC # BLD AUTO: 9.6 10E3/UL (ref 4–11)

## 2024-06-28 PROCEDURE — 93005 ELECTROCARDIOGRAM TRACING: CPT

## 2024-06-28 PROCEDURE — 87651 STREP A DNA AMP PROBE: CPT | Performed by: BEHAVIOR TECHNICIAN

## 2024-06-28 PROCEDURE — 250N000013 HC RX MED GY IP 250 OP 250 PS 637: Performed by: INTERNAL MEDICINE

## 2024-06-28 PROCEDURE — 255N000002 HC RX 255 OP 636: Performed by: HOSPITALIST

## 2024-06-28 PROCEDURE — 84484 ASSAY OF TROPONIN QUANT: CPT | Performed by: BEHAVIOR TECHNICIAN

## 2024-06-28 PROCEDURE — 99204 OFFICE O/P NEW MOD 45 MIN: CPT | Mod: 25 | Performed by: INTERNAL MEDICINE

## 2024-06-28 PROCEDURE — 250N000013 HC RX MED GY IP 250 OP 250 PS 637

## 2024-06-28 PROCEDURE — 85610 PROTHROMBIN TIME: CPT | Performed by: EMERGENCY MEDICINE

## 2024-06-28 PROCEDURE — 96365 THER/PROPH/DIAG IV INF INIT: CPT

## 2024-06-28 PROCEDURE — 36415 COLL VENOUS BLD VENIPUNCTURE: CPT | Performed by: EMERGENCY MEDICINE

## 2024-06-28 PROCEDURE — 96366 THER/PROPH/DIAG IV INF ADDON: CPT

## 2024-06-28 PROCEDURE — 36415 COLL VENOUS BLD VENIPUNCTURE: CPT | Performed by: BEHAVIOR TECHNICIAN

## 2024-06-28 PROCEDURE — 999N000208 ECHOCARDIOGRAM COMPLETE

## 2024-06-28 PROCEDURE — 99291 CRITICAL CARE FIRST HOUR: CPT | Mod: 25

## 2024-06-28 PROCEDURE — 80053 COMPREHEN METABOLIC PANEL: CPT | Performed by: BEHAVIOR TECHNICIAN

## 2024-06-28 PROCEDURE — 250N000011 HC RX IP 250 OP 636: Performed by: EMERGENCY MEDICINE

## 2024-06-28 PROCEDURE — 96375 TX/PRO/DX INJ NEW DRUG ADDON: CPT

## 2024-06-28 PROCEDURE — 84443 ASSAY THYROID STIM HORMONE: CPT | Performed by: EMERGENCY MEDICINE

## 2024-06-28 PROCEDURE — 85025 COMPLETE CBC W/AUTO DIFF WBC: CPT | Performed by: BEHAVIOR TECHNICIAN

## 2024-06-28 PROCEDURE — 258N000003 HC RX IP 258 OP 636: Performed by: EMERGENCY MEDICINE

## 2024-06-28 PROCEDURE — 250N000009 HC RX 250: Performed by: EMERGENCY MEDICINE

## 2024-06-28 PROCEDURE — 87637 SARSCOV2&INF A&B&RSV AMP PRB: CPT | Performed by: BEHAVIOR TECHNICIAN

## 2024-06-28 PROCEDURE — 210N000002 HC R&B HEART CARE

## 2024-06-28 PROCEDURE — 85730 THROMBOPLASTIN TIME PARTIAL: CPT | Performed by: EMERGENCY MEDICINE

## 2024-06-28 PROCEDURE — 93306 TTE W/DOPPLER COMPLETE: CPT | Mod: 26 | Performed by: INTERNAL MEDICINE

## 2024-06-28 PROCEDURE — 99223 1ST HOSP IP/OBS HIGH 75: CPT

## 2024-06-28 PROCEDURE — 83735 ASSAY OF MAGNESIUM: CPT | Performed by: EMERGENCY MEDICINE

## 2024-06-28 RX ORDER — AMOXICILLIN 250 MG
2 CAPSULE ORAL 2 TIMES DAILY PRN
Status: DISCONTINUED | OUTPATIENT
Start: 2024-06-28 | End: 2024-06-29 | Stop reason: HOSPADM

## 2024-06-28 RX ORDER — LIDOCAINE 40 MG/G
CREAM TOPICAL
Status: DISCONTINUED | OUTPATIENT
Start: 2024-06-28 | End: 2024-06-29 | Stop reason: HOSPADM

## 2024-06-28 RX ORDER — ACETAMINOPHEN 650 MG/1
650 SUPPOSITORY RECTAL EVERY 4 HOURS PRN
Status: DISCONTINUED | OUTPATIENT
Start: 2024-06-28 | End: 2024-06-29 | Stop reason: HOSPADM

## 2024-06-28 RX ORDER — CALCIUM CARBONATE 500 MG/1
1000 TABLET, CHEWABLE ORAL 4 TIMES DAILY PRN
Status: DISCONTINUED | OUTPATIENT
Start: 2024-06-28 | End: 2024-06-29 | Stop reason: HOSPADM

## 2024-06-28 RX ORDER — ACETAMINOPHEN 325 MG/1
650 TABLET ORAL EVERY 4 HOURS PRN
Status: DISCONTINUED | OUTPATIENT
Start: 2024-06-28 | End: 2024-06-29 | Stop reason: HOSPADM

## 2024-06-28 RX ORDER — FLECAINIDE ACETATE 50 MG/1
50 TABLET ORAL 2 TIMES DAILY
Status: DISCONTINUED | OUTPATIENT
Start: 2024-06-28 | End: 2024-06-28

## 2024-06-28 RX ORDER — HYDRALAZINE HYDROCHLORIDE 10 MG/1
10 TABLET, FILM COATED ORAL EVERY 4 HOURS PRN
Status: DISCONTINUED | OUTPATIENT
Start: 2024-06-28 | End: 2024-06-29 | Stop reason: HOSPADM

## 2024-06-28 RX ORDER — ONDANSETRON 2 MG/ML
4 INJECTION INTRAMUSCULAR; INTRAVENOUS EVERY 6 HOURS PRN
Status: DISCONTINUED | OUTPATIENT
Start: 2024-06-28 | End: 2024-06-28

## 2024-06-28 RX ORDER — DILTIAZEM HYDROCHLORIDE 5 MG/ML
15 INJECTION INTRAVENOUS ONCE
Status: COMPLETED | OUTPATIENT
Start: 2024-06-28 | End: 2024-06-28

## 2024-06-28 RX ORDER — METOPROLOL TARTRATE 25 MG/1
25 TABLET, FILM COATED ORAL 3 TIMES DAILY
Status: DISCONTINUED | OUTPATIENT
Start: 2024-06-28 | End: 2024-06-29

## 2024-06-28 RX ORDER — ONDANSETRON 4 MG/1
4 TABLET, ORALLY DISINTEGRATING ORAL EVERY 6 HOURS PRN
Status: DISCONTINUED | OUTPATIENT
Start: 2024-06-28 | End: 2024-06-28

## 2024-06-28 RX ORDER — AMOXICILLIN 250 MG
1 CAPSULE ORAL 2 TIMES DAILY PRN
Status: DISCONTINUED | OUTPATIENT
Start: 2024-06-28 | End: 2024-06-29 | Stop reason: HOSPADM

## 2024-06-28 RX ORDER — HEPARIN SODIUM 10000 [USP'U]/100ML
0-5000 INJECTION, SOLUTION INTRAVENOUS CONTINUOUS
Status: DISCONTINUED | OUTPATIENT
Start: 2024-06-28 | End: 2024-06-28

## 2024-06-28 RX ORDER — HYDRALAZINE HYDROCHLORIDE 20 MG/ML
10 INJECTION INTRAMUSCULAR; INTRAVENOUS EVERY 4 HOURS PRN
Status: DISCONTINUED | OUTPATIENT
Start: 2024-06-28 | End: 2024-06-29 | Stop reason: HOSPADM

## 2024-06-28 RX ADMIN — DILTIAZEM HYDROCHLORIDE 5 MG/HR: 5 INJECTION, SOLUTION INTRAVENOUS at 08:58

## 2024-06-28 RX ADMIN — FLECAINIDE ACETATE 50 MG: 50 TABLET ORAL at 13:12

## 2024-06-28 RX ADMIN — METOPROLOL TARTRATE 25 MG: 25 TABLET, FILM COATED ORAL at 13:12

## 2024-06-28 RX ADMIN — DILTIAZEM HYDROCHLORIDE 15 MG: 5 INJECTION INTRAVENOUS at 08:11

## 2024-06-28 RX ADMIN — HUMAN ALBUMIN MICROSPHERES AND PERFLUTREN 9 ML: 10; .22 INJECTION, SOLUTION INTRAVENOUS at 14:15

## 2024-06-28 RX ADMIN — HEPARIN SODIUM 800 UNITS/HR: 10000 INJECTION, SOLUTION INTRAVENOUS at 08:58

## 2024-06-28 RX ADMIN — APIXABAN 5 MG: 5 TABLET, FILM COATED ORAL at 22:41

## 2024-06-28 RX ADMIN — METOPROLOL TARTRATE 25 MG: 25 TABLET, FILM COATED ORAL at 22:41

## 2024-06-28 RX ADMIN — APIXABAN 5 MG: 5 TABLET, FILM COATED ORAL at 18:37

## 2024-06-28 ASSESSMENT — ACTIVITIES OF DAILY LIVING (ADL)
ADLS_ACUITY_SCORE: 35
ADLS_ACUITY_SCORE: 20
ADLS_ACUITY_SCORE: 20
ADLS_ACUITY_SCORE: 35
ADLS_ACUITY_SCORE: 20
ADLS_ACUITY_SCORE: 20
ADLS_ACUITY_SCORE: 35
ADLS_ACUITY_SCORE: 20
ADLS_ACUITY_SCORE: 35

## 2024-06-28 ASSESSMENT — COLUMBIA-SUICIDE SEVERITY RATING SCALE - C-SSRS
2. HAVE YOU ACTUALLY HAD ANY THOUGHTS OF KILLING YOURSELF IN THE PAST MONTH?: NO
6. HAVE YOU EVER DONE ANYTHING, STARTED TO DO ANYTHING, OR PREPARED TO DO ANYTHING TO END YOUR LIFE?: NO
1. IN THE PAST MONTH, HAVE YOU WISHED YOU WERE DEAD OR WISHED YOU COULD GO TO SLEEP AND NOT WAKE UP?: NO

## 2024-06-28 NOTE — ED TRIAGE NOTES
Hx of afib. Felt he may of been going in/out of it since yesterday AM  but today it has been consistent. Not on thinners. Denies chest pain/SOB but does c/o some dizziness.

## 2024-06-28 NOTE — CONSULTS
[facility]  Electrophysiology Consultation     Date of Admission:  6/28/2024  Primary Cardiologist: ***  Date of Consult (When I saw the patient): 06/28/24  Reason for consult: atrial flutter with RVR    History of Present Illness   Trevon Nice is a 68 year old male who has a past medical history significant for ***    Trevon Nice presents with ***    COVID POSITIVE    Assessment:  ***  ***  ***    Plan:   ***  ***      This assessment and plan was formulated under the guidance of ***.    A total of *** minutes was spent face-to-face or coordinating care of Trevon Nice. Over 50% of our time was spent counseling the patient and/or coordinating care.  --------------------------------------------------------------------------------------------  Toya Durbin NP, APRN CNP  Pager:  [phone]      Code Status    No Order    Reason for Consult   Reason for consult: { :[id number]}    Primary Care Physician   Sienna La    Chief Complaint   ***    History is obtained from the patient    Past Medical History   I have reviewed this patient's medical history and updated it with pertinent information if needed.   Past Medical History:   Diagnosis Date    Atrial fibrillation (H)     Hypertension     Prostatitis        Past Surgical History   I have reviewed this patient's surgical history and updated it with pertinent information if needed.  Past Surgical History:   Procedure Laterality Date    PROSTATE SURGERY         Prior to Admission Medications   Prior to Admission Medications   Prescriptions Last Dose Informant Patient Reported? Taking?   Flecainide Acetate (TAMBOCOR PO)   Yes No   Sig: Take 50 mg by mouth   METOPROLOL TARTRATE PO   Yes No   Sig: Take 25 mg by mouth   TAMSULOSIN HCL PO   Yes No   Sig: Take 0.4 mg by mouth      Facility-Administered Medications: None     Allergies   No Known Allergies    Social History   I have reviewed this patient's social history and  "updated it with pertinent information if needed. Trevon Nice  reports that he has never smoked. He has never used smokeless tobacco. He reports current alcohol use. He reports that he does not use drugs.    Family History   I have reviewed this patient's family history and updated it with pertinent information if needed.   No family history on file.    Review of Systems   The 5 point Review of Systems is negative other than noted in the HPI or here. ***    --------------------------------------------------------------------------------------------    Temp:  [98.4  F (36.9  C)] 98.4  F (36.9  C)  Pulse:  [145-146] 146  Resp:  [9-18] 18  BP: (115-120)/() 120/104  SpO2:  [96 %-98 %] 96 %  150 lbs 0 oz    Constitutional:   NAD   Skin:   Warm and dry   Head:   Nontraumatic   Neck:   {*:915826}   Lungs:   {*:046840}   Cardiovascular:   {*:245536}   Abdomen:   Benign   Extremities and Back:   {*:313840}   Neurological:   Grossly nonfocal   Data   Recent Labs   Lab Test 06/28/24  0744 06/28/24  0740 05/25/18  1145   WBC 9.6  --  16.6*   HGB 16.3  --  14.9   MCV 87  --  88     --  193   INR  --  1.11  --       Recent Labs   Lab Test 06/28/24  0740 05/25/18  1145    136   POTASSIUM 4.3 4.1   CHLORIDE 102 104   BUN 17.5 24   CR 1.20* 1.45*     Recent Labs   Lab 06/28/24  0740   *     Recent Labs   Lab Test 06/28/24  0740   ALT 19   AST 15     Troponin I ES   Date Value Ref Range Status   06/29/2014 <0.012 0.000 - 0.034 ug/L Final       No lab results found.    No results found for: \"CHOL\"  No results found for: \"HDL\"  No results found for: \"LDL\"  No results found for: \"TRIG\"  No results found for: \"CHOLHDLRATIO\"     No results found for: \"TSH\"                  "

## 2024-06-28 NOTE — ED PROVIDER NOTES
"  Emergency Department Note      History of Present Illness     Chief Complaint   Palpitations (Pt hx of afib. Believes he started going in/out of afib yesterday but this AM noticed consistent HR 140s. Not on thinners. Pt denies chest pain or SOB but some dizziness.)      HPI   Trevon Nice is a 68 year old male who is not anticoagulated with a history of atrial fibrillation, hypertension, hyperlipidemia who presents with palpitations. Patient notes that he was feeling off yesterday with a runny nose, sore throat, muscle aches. He noted that his heart rate was detected at 100 bpm yesterday. He reports that he did not sleep well last night. He notes that this morning he no longer had a runny nose or a sore throat. He reports that his fitness tracker detected that his heart rate was 140-145 this morning that was coupled to dizziness. He states that his heart rate is typically elevated only once a week. He denies any missed doses of flecainide. He denies shortness of breath, chest pain.     Independent Historian   The patient's wife is present and provides additional history in regards to his prior treatment for atrial fibrillation and the progression of his current symptoms.    Review of External Notes   Cardiology notes reviewed from August 11, 2023.  The patient has a history of paroxysmal atrial fibrillation.  He is currently on flecainide and had been on Toprol.  He is not anticoagulated as his [id number] score was 1.    Past Medical History     Medical History and Problem List   BPH  Hyperlipidemia  Nocturia  Paroxysmal atrial fibrillation    Medications   Tambocor    Surgical History   Prostate surgery    Physical Exam     Patient Vitals for the past 24 hrs:   BP Temp Temp src Pulse Resp SpO2 Height Weight   06/28/24 0745 -- -- -- (!) 146 18 96 % -- --   06/28/24 0730 (!) 120/104 -- -- (!) 146 (!) 9 96 % -- --   06/28/24 0718 115/89 98.4  F (36.9  C) Temporal (!) 145 16 98 % 1.702 m (5' 7\") 68 kg (150 " lb)   06/28/24 0717 -- -- -- -- 18 -- -- --     Physical Exam  Constitutional:       General: He is in acute distress.   HENT:      Head: Atraumatic.      Right Ear: External ear normal.      Left Ear: External ear normal.      Nose: Nose normal.      Mouth/Throat:      Pharynx: No oropharyngeal exudate or posterior oropharyngeal erythema.   Eyes:      General: No scleral icterus.     Conjunctiva/sclera: Conjunctivae normal.      Pupils: Pupils are equal, round, and reactive to light.   Cardiovascular:      Rate and Rhythm: Regular rhythm. Tachycardia present.      Heart sounds: Normal heart sounds.   Pulmonary:      Effort: Pulmonary effort is normal. No respiratory distress.      Breath sounds: Normal breath sounds.   Abdominal:      Palpations: Abdomen is soft.      Tenderness: There is no abdominal tenderness.   Musculoskeletal:         General: No deformity.      Cervical back: Neck supple.      Right lower leg: No edema.      Left lower leg: No edema.   Skin:     General: Skin is warm.      Capillary Refill: Capillary refill takes less than 2 seconds.   Neurological:      General: No focal deficit present.      Mental Status: He is alert and oriented to person, place, and time.   Psychiatric:         Mood and Affect: Mood normal.         Behavior: Behavior normal.           Diagnostics     Lab Results   Labs Ordered and Resulted from Time of ED Arrival to Time of ED Departure   INFLUENZA A/B, RSV, & SARS-COV2 PCR - Abnormal       Result Value    Influenza A PCR Negative      Influenza B PCR Negative      RSV PCR Negative      SARS CoV2 PCR Positive (*)    COMPREHENSIVE METABOLIC PANEL - Abnormal    Sodium 137      Potassium 4.3      Carbon Dioxide (CO2) 23      Anion Gap 12      Urea Nitrogen 17.5      Creatinine 1.20 (*)     GFR Estimate 65      Calcium 9.4      Chloride 102      Glucose 105 (*)     Alkaline Phosphatase 83      AST 15      ALT 19      Protein Total 6.9      Albumin 4.3      Bilirubin Total  0.7     CBC WITH PLATELETS AND DIFFERENTIAL - Abnormal    WBC Count 9.6      RBC Count 5.45      Hemoglobin 16.3      Hematocrit 47.5      MCV 87      MCH 29.9      MCHC 34.3      RDW 12.1      Platelet Count 236      % Neutrophils 79      % Lymphocytes 7      % Monocytes 13      % Eosinophils 1      % Basophils 0      % Immature Granulocytes 0      NRBCs per 100 WBC 0      Absolute Neutrophils 7.6      Absolute Lymphocytes 0.7 (*)     Absolute Monocytes 1.2      Absolute Eosinophils 0.1      Absolute Basophils 0.0      Absolute Immature Granulocytes 0.0      Absolute NRBCs 0.0     TROPONIN T, HIGH SENSITIVITY - Normal    Troponin T, High Sensitivity 13     INR - Normal    INR 1.11     PARTIAL THROMBOPLASTIN TIME - Normal    aPTT 32     MAGNESIUM - Normal    Magnesium 2.0     TSH WITH FREE T4 REFLEX - Normal    TSH 2.53     GROUP A STREPTOCOCCUS PCR THROAT SWAB - Normal    Group A strep by PCR Not Detected     TROPONIN T, HIGH SENSITIVITY       Imaging   No orders to display       EKG   ECG taken at 0712, ECG read at 0807  Critical Test Result: High HR  Atrial flutter with 2:1 AV conduction  Left axis deviation  Nonspecific ST and T wave abnormality   Novel atrial flutter as compared to prior, dated 05/25/2018.  Rate 147 bpm. IA interval * ms. QRS duration 84 ms. QT/QTc 340/572 ms. P-R-T axes 75 -43 90.    ED Course      Medications Administered   Medications   diltiazem (CARDIZEM) 125 mg in sodium chloride 0.9 % 125 mL infusion (5 mg/hr Intravenous $New Bag 6/28/24 0858)   heparin 25,000 units in 0.45% NaCl 250 mL ANTICOAGULANT infusion ( Intravenous Canceled Entry 6/28/24 0859)   diltiazem (CARDIZEM) injection 15 mg (15 mg Intravenous $Given 6/28/24 0811)   heparin loading dose for LOW INTENSITY TREATMENT * Give BEFORE starting heparin infusion (4,100 Units Intravenous $Given 6/28/24 0857)       Discussion of Management   Admitting Hospitalist, Neela  Cardiology, Dr. Nye    ED Course   ED Course as of  06/28/24 0902   Fri Jun 28, 2024   0737 I evaluated patient and obtained history.   0745 Dr. Burrell obtained history and examined the patient as noted above     0807 I spoke with Dr. Nye, cardiology, to discuss my findings and subsequent plan of care    0825 Reassessed patient. Discussed admission.    0847 Discussed with Dr. Coe. Accepted patient for admission.       Medical Decision Making / Diagnosis     MDM   Trevon Nice is a 68 year old male who presents to the ED with tachycardia.  He has had URI symptoms over the last 2 days but this has actually improved.  His Fitbit indicated that he was tachycardic when he does not ask or experience it.  On arrival here he has a heart rate nearly 150.  However, is not having any chest pain or hypotension.  He has a history of paroxysmal A-fib and today appears to be in rapid atrial flutter.  I spoke with cardiology in regards to potentially giving flecainide.  This is not recommended but rather we will initiate diltiazem.  Given that we cannot say for sure along the patient has been in atrial fibrillation he would not be candidate for cardioversion immediately.  He will be heparinized though.    The patient has had close frequent repeat evaluations.  Fortunately no decompensation.    The patient did test positive for COVID-19 but as stated has improved and is now largely asymptomatic from this perspective.    Critical Care time was 30 minutes for this patient excluding procedures.     Disposition   The patient was admitted to the hospital.     Diagnosis     ICD-10-CM    1. COVID-19  U07.1       2. Atrial flutter with rapid ventricular response (H)  I48.92            Scribe Disclosure:  I, Elliot Soria, am serving as a scribe at 8:10 AM on 6/28/2024 to document services personally performed by Alexis Burrell MD based on my observations and the provider's statements to me.        Alexis Burrell MD  06/28/24 0902

## 2024-06-28 NOTE — PHARMACY-ADMISSION MEDICATION HISTORY
Pharmacist Admission Medication History    Admission medication history is complete. The information provided in this note is only as accurate as the sources available at the time of the update.    Information Source(s): Patient and CareEverywhere/SureScripts via phone    Pertinent Information: none    Changes made to PTA medication list:  Added: None  Deleted: metoprolol, tamsulosin  Changed: flecainide to bid    Allergies reviewed with patient and updates made in EHR: no    Medication History Completed By: Concepción Estrada RPH 6/28/2024 9:15 AM    PTA Med List   Medication Sig Last Dose    Flecainide Acetate (TAMBOCOR PO) Take 50 mg by mouth 2 times daily 6/27/2024 at pm

## 2024-06-28 NOTE — H&P
[facility]    History and Physical - Hospitalist Service       Date of Admission:  6/28/2024    Assessment & Plan      Trevon Nice is a 68 year old male admitted on 6/28/2024 with palpitations, as well as runny nose, sore throat, muscle aches. He has a past medical history of atrial fibrillation, not on anticoagulation, hypertension, hyperlipidemia.     Atrial Flutter with Rapid Ventricular Response  Presents with palpitations which started on 6/27. He reports that his heart rate was initially around 100. On the morning of 6/28 however, his fitness tracker showed that his HR was in the 140s. He reports feeling dizzy at the time as well, thus prompting him to be seen in the ED.  *CHADS-VASc score-2, Stroke risk 2.2% per year  *Troponin 13  *Magnesium 2.0  *Potassium 4.3  -Consult Cariology  -Diltiazem gtt  -Heparin, low intensity dosing  -Cardioversion if HR remains elevated despite Diltiazem  -Continuous telemetry monitoring  -Echocardiogram    COVID 19 Infection  He reports runny nose, sore throat and myalgias starting the evening of 6/27. He has been afebrile, denies cough shortness of breath or any other potentially related symptoms. Not requiring supplemental oxygen.  *Last COVID vaccine 11/21/23  *SARS CoV2 PCR positive  *Influenza A, B and RSV negative  *WBC 9.6  -Continuous pule ox  -Monitor respiratory status    Hypertension  Normotensive in the ED. Not on PTA medication    Hyperlipidemia  Diet controled. Not on PTA medication    Diet:  Regular  DVT Prophylaxis: Pneumatic Compression Devices and heparin drip low intensity dosing for atrial flutter.  Loya Catheter: Not present  Lines: None     Cardiac Monitoring: None  Code Status:  Full Code    Clinically Significant Risk Factors Present on Admission        Disposition Plan     Medically Ready for Discharge: Anticipated in 2-4 Days    The patient's care was discussed with the Attending Physician, Dr. Cantu .    Hernandez  "CAMERON Keita NP  Hospitalist Service  [facility]  Securely message with [employer] (more info)  Text page via [employer] Paging/Directory     ___________________________________________________  Chief Complaint   Palpitations, runny nose, sore throat, muscle aches    History is obtained from the patient    History of Present Illness   Trevon Nice is a 68 year old male who has a past medical history of paroxysmal a-fib, not on anticoagulation, hypertension and hyperlipidemia. He presents with palpitations as well as runny nose, sore throat and myalgias. He reports that on 6/27, he noticed that his heart rate had increased to around 100. In addition, he started to develop upper respiratory symptoms, and thought that he was \"getting a cold.\" On the morning of 6/28, he felt dizzy with increasing palpitations and upon checking his fitness tracker, noticed that his heart rate was in the 140s. Upon presenting to the ED, he was noted to be in an atrial flutter with RVR, but has remained hemodynamically stable.  He was seen by cardiology in the ED who recommended a loading dose of diltiazem followed by a drip, as well as an IV heparin gtt. These were both initiated in the ED. He also tested positive for COVID, but symptoms have been mild thus far.  At current, he denies the presence of any headache, chest pain, shortness of breath, palpitations, abdominal pain, nausea or vomiting.  Plan to continue Heparin and Diltiazem drips, monitor his respiratory status and potentially cardioversion if rate control is not achieved with diltiazem. Cardiology has been formally consulted. Will also obtain echocardiogram and initiate continuous telemetry monitoring.      Past Medical History    Past Medical History:   Diagnosis Date    Atrial fibrillation (H)     Hypertension     Prostatitis      Past Surgical History   Past Surgical History:   Procedure Laterality Date    PROSTATE SURGERY       Prior to Admission " Medications   Prior to Admission Medications   Prescriptions Last Dose Informant Patient Reported? Taking?   Flecainide Acetate (TAMBOCOR PO) 6/27/2024 at pm  Yes Yes   Sig: Take 50 mg by mouth 2 times daily      Facility-Administered Medications: None      Physical Exam   Vital Signs: Temp: 98.4  F (36.9  C) Temp src: Temporal BP: (!) 120/104 Pulse: (!) 146   Resp: 18 SpO2: 96 % O2 Device: None (Room air)    Weight: 150 lbs 0 oz  Physical Exam  Vitals reviewed.   Constitutional:       General: He is not in acute distress.     Appearance: He is not ill-appearing or toxic-appearing.   Cardiovascular:      Rate and Rhythm: Normal rate. Rhythm irregular.      Pulses: Normal pulses.      Heart sounds: Normal heart sounds.   Pulmonary:      Effort: Pulmonary effort is normal.      Breath sounds: Normal breath sounds and air entry.   Abdominal:      General: Abdomen is flat. Bowel sounds are normal.      Palpations: Abdomen is soft.      Tenderness: There is no abdominal tenderness.   Skin:     General: Skin is warm and dry.   Neurological:      General: No focal deficit present.      Mental Status: He is alert.   Psychiatric:         Attention and Perception: Attention normal.         Mood and Affect: Mood and affect normal.         Speech: Speech normal.         Behavior: Behavior is cooperative.         Thought Content: Thought content normal.         Cognition and Memory: Cognition normal.         Judgment: Judgment normal.        Medical Decision Making     75 MINUTES SPENT BY ME on the date of service doing chart review, history, exam, documentation & further activities per the note.      Data     I have personally reviewed the following data over the past 24 hrs:    9.6  \   16.3   / 236     137 102 17.5 /  105 (H)   4.3 23 1.20 (H) \     ALT: 19 AST: 15 AP: 83 TBILI: 0.7   ALB: 4.3 TOT PROTEIN: 6.9 LIPASE: N/A     Trop: 13 BNP: N/A     TSH: 2.53 T4: N/A A1C: N/A     INR:  1.11 PTT:  32   D-dimer:  N/A Fibrinogen:   N/A       Imaging results reviewed over the past 24 hrs:   No results found for this or any previous visit (from the past 24 hour(s)).

## 2024-06-28 NOTE — CONSULTS
[facility]    Electrophysiology Consultation     Date of Admission:  6/28/2024  Date of Consult: 06/28/24    Assessment & Plan   Trevon Nice is a 68 year old male who was admitted on 6/28/2024. We were asked to see the patient for atrial flutter.  Presenting EKG showed atrial flutter with heart rates in the 140s.  Heart rates are now down to 60s on low-dose diltiazem drip at 5 mg.  Echocardiogram performed today showed moderately reduced LV function, EF 40%, normal atrial size.  Blood work shows stable renal function, normal potassium and magnesium levels.  Normal TSH.  He did test positive for COVID.  He is currently feeling well and was tolerating the rapid atrial flutter well.  His atrial flutter was likely triggered by acute COVID infection.  His rates are very well-controlled on a low-dose of IV diltiazem so I anticipate he will do well with oral metoprolol as well.  Since he has been in persistent atrial flutter for at least 2 days and I recommended stopping flecainide and continuing a rate control strategy for now.  We will start oral anticoagulation and I would recommend pursuing rhythm control after he has been fully anticoagulated.  I recommended outpatient follow-up in a month.  He follows with cardiology through [facility] and already has scheduled follow-up with them.  We will be happy to see him here if he decides to transfer his care here.    -Start metoprolol for rate control and stop flecainide  -Start Eliquis 5 mg twice daily  -Discontinue IV diltiazem  -Discontinue IV heparin  -Already scheduled for routine follow up with cardiology through [facility]      Jeromy Pina MD        Code Status    Full Code    Reason for Consult   Reason for consult: Consult performed at the request of the attending physician, Ad Cantu MD, for evaluation of atrial flutter      Chief Complaint   Atrial flutter with RVR    History is obtained from the patient and EPIC  chart.      History of Present Illness   Trevon Nice is a 68 year old male with hyperlipidemia, CKD, paroxysmal atrial fibrillation, who presents with atrial flutter with rapid ventricular rates.  His presenting EKG showed atrial flutter with heart rates in the 140s.  He was started on IV diltiazem and heparin.  He did test positive for COVID.  He reports noticing elevated heart rates may be since Wednesday.  He developed URI symptoms yesterday and attributed his not feeling well to a viral illness.  This morning his fitness watch alerted him to elevated heart rates in the 140s, which prompted his presentation to the ED for evaluation.  He has been largely asymptomatic other than having URI symptoms.  He reports no chest pain, shortness of breath, or dizziness.  He was first diagnosed with atrial fibrillation about 10 years ago and states that episodes have been relatively infrequent and terminate spontaneously.  He has never needed cardioversion.  He has been on flecainide for rhythm control during this time and thinks his dosage was actually decreased a few years ago.  He used to be on metoprolol as well which was discontinued.  He has not had any significant Afib episodes for at least 2 years.  He has never been told that he has atrial flutter in the past.    He has not been on anticoagulation in the past.  No history of bleeding disorders.  Of note, his wife has had 2 prior atrial fibrillation ablations so he is familiar with the procedure.  She is also on Eliquis for anticoagulation.            Past Medical History   I have reviewed this patient's medical history and updated it with pertinent information if needed.   Past Medical History:   Diagnosis Date    Atrial fibrillation (H)     Hypertension     Prostatitis        Past Surgical History   I have reviewed this patient's surgical history and updated it with pertinent information if needed.  Past Surgical History:   Procedure Laterality Date     PROSTATE SURGERY         Prior to Admission Medications   Prior to Admission Medications   Prescriptions Last Dose Informant Patient Reported? Taking?   Flecainide Acetate (TAMBOCOR PO) 6/27/2024 at pm  Yes Yes   Sig: Take 50 mg by mouth 2 times daily      Facility-Administered Medications: None         Medications   Current Facility-Administered Medications   Medication Dose Route Frequency Provider Last Rate Last Admin    diltiazem (CARDIZEM) 125 mg in sodium chloride 0.9 % 125 mL infusion  5-15 mg/hr Intravenous Continuous Alexis Burrell MD   Paused at 06/28/24 1524    Patient is already receiving anticoagulation with heparin, enoxaparin (LOVENOX), warfarin (COUMADIN)  or other anticoagulant medication   Does not apply Continuous PRN Hernandez Keita NP         Current Facility-Administered Medications   Medication Dose Route Frequency Provider Last Rate Last Admin    apixaban ANTICOAGULANT (ELIQUIS) tablet 5 mg  5 mg Oral BID Jeromy Pina MD        metoprolol tartrate (LOPRESSOR) tablet 25 mg  25 mg Oral TID Jeromy Pina MD   25 mg at 06/28/24 1312    sodium chloride (PF) 0.9% PF flush 3 mL  3 mL Intracatheter Q8H Hernandez Keita NP           Allergies   No Known Allergies    Social History   I have updated and reviewed the following Social History Narrative:   Social History     Social History Narrative    Not on file        Family History   I have reviewed this patient's family history and updated it with pertinent information if needed.   No family history on file.    Review of Systems   A complete 10-point review of systems was done and is negative with the exceptions noted in HPI.      Physical Exam   Temp: 98.4  F (36.9  C) Temp src: Oral BP: (!) 89/72 Pulse: 65   Resp: 16 SpO2: 99 % O2 Device: None (Room air)    Vital Signs with Ranges  Temp:  [98.4  F (36.9  C)-98.6  F (37  C)] 98.4  F (36.9  C)  Pulse:  [] 65  Resp:  [9-18] 16  BP: ()/() 89/72  SpO2:   [95 %-100 %] 99 %  144 lbs 6.42 oz    General: Pleasant, no acute distress  Resp: Breathing comfortably on room air  Card: Irregular  Extremities: No significant edema   Neuro: Awake, alert, answers questions appropriately       Diagnostics:  I personally reviewed the patient's EKG, lab work, and pertinent imaging    Telemetry:  Aflutter, rate 60's    EKG  6/28/2024 - Aflutter, rate 147 bpm      Results for orders placed or performed during the hospital encounter of 06/28/24 (from the past 24 hour(s))   EKG 12 lead   Result Value Ref Range    Systolic Blood Pressure  mmHg    Diastolic Blood Pressure  mmHg    Ventricular Rate 147 BPM    Atrial Rate 294 BPM    KY Interval  ms    QRS Duration 84 ms     ms    QTc 532 ms    P Axis 75 degrees    R AXIS -43 degrees    T Axis 90 degrees    Interpretation ECG       ** Critical Test Result: High HR  Atrial flutter with 2:1 A-V conduction  Left axis deviation  Nonspecific ST and T wave abnormality  Abnormal ECG  When compared with ECG of 25-MAY-2018 12:42,  Significant changes have occurred     [place] Draw    Narrative    The following orders were created for panel order [place] Draw.  Procedure                               Abnormality         Status                     ---------                               -----------         ------                     Extra Blue Top Tube[[id number]]                                                         Extra Red Top Tube[[id number]]                               Final result               Extra Green Top (Lithium...[[id number]]                                                 Extra Purple Top Tube[[id number]]                            Final result                 Please view results for these tests on the individual orders.   Extra Red Top Tube   Result Value Ref Range    Hold Specimen JIC    Extra Purple Top Tube   Result Value Ref Range    Hold Specimen JIC    Troponin T, High Sensitivity   Result Value Ref Range    Troponin T,  High Sensitivity 13 <=22 ng/L   Comprehensive metabolic panel   Result Value Ref Range    Sodium 137 135 - 145 mmol/L    Potassium 4.3 3.4 - 5.3 mmol/L    Carbon Dioxide (CO2) 23 22 - 29 mmol/L    Anion Gap 12 7 - 15 mmol/L    Urea Nitrogen 17.5 8.0 - 23.0 mg/dL    Creatinine 1.20 (H) 0.67 - 1.17 mg/dL    GFR Estimate 65 >60 mL/min/1.73m2    Calcium 9.4 8.8 - 10.2 mg/dL    Chloride 102 98 - 107 mmol/L    Glucose 105 (H) 70 - 99 mg/dL    Alkaline Phosphatase 83 40 - 150 U/L    AST 15 0 - 45 U/L    ALT 19 0 - 70 U/L    Protein Total 6.9 6.4 - 8.3 g/dL    Albumin 4.3 3.5 - 5.2 g/dL    Bilirubin Total 0.7 <=1.2 mg/dL   INR   Result Value Ref Range    INR 1.11 0.85 - 1.15   Partial thromboplastin time   Result Value Ref Range    aPTT 32 22 - 38 Seconds   Magnesium   Result Value Ref Range    Magnesium 2.0 1.7 - 2.3 mg/dL   TSH with free T4 reflex   Result Value Ref Range    TSH 2.53 0.30 - 4.20 uIU/mL   CBC with platelets differential    Narrative    The following orders were created for panel order CBC with platelets differential.  Procedure                               Abnormality         Status                     ---------                               -----------         ------                     CBC with platelets and d...[[id number]]  Abnormal            Final result                 Please view results for these tests on the individual orders.   Symptomatic Influenza A/B, RSV, & SARS-CoV2 PCR (COVID-19) Nasopharyngeal    Specimen: Nasopharyngeal; Swab   Result Value Ref Range    Influenza A PCR Negative Negative    Influenza B PCR Negative Negative    RSV PCR Negative Negative    SARS CoV2 PCR Positive (A) Negative    Narrative    Testing was performed using the Xpert Xpress CoV2/Flu/RSV Assay on the [employer] Instrument. This test should be ordered for the detection of SARS-CoV-2, influenza, and RSV viruses in individuals who meet clinical and/or epidemiological criteria. Test performance is unknown in  asymptomatic patients. This test is for in vitro diagnostic use under the FDA EUA for laboratories certified under CLIA to perform high or moderate complexity testing. This test has not been FDA cleared or approved. A negative result does not rule out the presence of PCR inhibitors in the specimen or target RNA in concentration below the limit of detection for the assay. If only one viral target is positive but coinfection with multiple targets is suspected, the sample should be re-tested with another FDA cleared, approved, or authorized test, if coinfection would change clinical management. This test was validated by the [facility] [employer]. These laboratories are certified under the Clinical Laboratory Improvement Amendments of 1988 (CLIA-88) as qualified to perform high complexity laboratory testing.   CBC with platelets and differential   Result Value Ref Range    WBC Count 9.6 4.0 - 11.0 10e3/uL    RBC Count 5.45 4.40 - 5.90 10e6/uL    Hemoglobin 16.3 13.3 - 17.7 g/dL    Hematocrit 47.5 40.0 - 53.0 %    MCV 87 78 - 100 fL    MCH 29.9 26.5 - 33.0 pg    MCHC 34.3 31.5 - 36.5 g/dL    RDW 12.1 10.0 - 15.0 %    Platelet Count 236 150 - 450 10e3/uL    % Neutrophils 79 %    % Lymphocytes 7 %    % Monocytes 13 %    % Eosinophils 1 %    % Basophils 0 %    % Immature Granulocytes 0 %    NRBCs per 100 WBC 0 <1 /100    Absolute Neutrophils 7.6 1.6 - 8.3 10e3/uL    Absolute Lymphocytes 0.7 (L) 0.8 - 5.3 10e3/uL    Absolute Monocytes 1.2 0.0 - 1.3 10e3/uL    Absolute Eosinophils 0.1 0.0 - 0.7 10e3/uL    Absolute Basophils 0.0 0.0 - 0.2 10e3/uL    Absolute Immature Granulocytes 0.0 <=0.4 10e3/uL    Absolute NRBCs 0.0 10e3/uL   Group A Streptococcus PCR Throat Swab    Specimen: Throat; Swab   Result Value Ref Range    Group A strep by PCR Not Detected Not Detected    Narrative    The Xpert Xpress Strep A test, performed on the [employer], is a rapid, qualitative in vitro diagnostic test for the  detection of Streptococcus pyogenes (Group A ß-hemolytic Streptococcus, Strep A) in throat swab specimens from patients with signs and symptoms of pharyngitis. The Xpert Xpress Strep A test can be used as an aid in the diagnosis of Group A Streptococcal pharyngitis. The assay is not intended to monitor treatment for Group A Streptococcus infections. The Xpert Xpress Strep A test utilizes an automated real-time polymerase chain reaction (PCR) to detect Streptococcus pyogenes DNA.   Troponin T, High Sensitivity   Result Value Ref Range    Troponin T, High Sensitivity 14 <=22 ng/L   Echocardiogram Complete   Result Value Ref Range    LVEF  40%     Narrative    [id number]  [id number]  [id number]  [id number]^YANG^FLASH^RAINER     [facility]  Echocardiography Laboratory  [address] [place] [address]     Name: WALT CORTEZ  MRN: [id number]  : 1955  Study Date: 2024 01:56 PM  Age: 68 yrs  Gender: Male  Patient Location: [facility]  Reason For Study: Aflutter  Ordering Physician: FLASH SORIA  Referring Physician: Sienna La  Performed By: Liz Her     BSA: 1.8 m2  Height: 67 in  Weight: 150 lb  HR: 75  BP: 120/104 mmHg  ______________________________________________________________________________  Procedure  Complete Portable Echo Adult. Optison (NDC #0413-6509) given intravenously.  ______________________________________________________________________________  Interpretation Summary     The visual ejection fraction is estimated at 40%.  Mildly decreased right ventricular systolic function  There is trace to mild tricuspid regurgitation.  The rhythm was atrial flutter.  The study was technically difficult. Optison contrast was used without  apparent complications. There is no comparison study available.  ______________________________________________________________________________  Left Ventricle  The left ventricle is normal in size. There is normal left  ventricular wall  thickness. The visual ejection fraction is estimated at 40%. There is mild-  moderate global hypokinesia of the left ventricle.     Right Ventricle  The right ventricle is normal size. Mildly decreased right ventricular  systolic function.     Atria  Normal left atrial size. Right atrial size is normal. There is no color  Doppler evidence of an atrial shunt.     Mitral Valve  The mitral valve is normal in structure and function. There is no mitral  regurgitation noted.     Tricuspid Valve  The tricuspid valve is normal in structure and function. There is trace to  mild tricuspid regurgitation. The right ventricular systolic pressure is  approximated at 24.7 mmHg plus the right atrial pressure.     Aortic Valve  There is trivial trileaflet aortic sclerosis. No aortic regurgitation is  present.     Pulmonic Valve  The pulmonic valve is not well visualized.     Vessels  Normal size aorta. Normal size ascending aorta.     Pericardium  There is no pericardial effusion.     Rhythm  The rhythm was atrial flutter.  ______________________________________________________________________________  MMode/2D Measurements & Calculations  IVSd: 0.80 cm     LVIDd: 4.1 cm  LVIDs: 3.3 cm  LVPWd: 0.90 cm  FS: 18.9 %  LV mass(C)d: 105.6 grams  LV mass(C)dI: 59.0 grams/m2  Ao root diam: 3.3 cm  LA dimension: 3.1 cm  asc Aorta Diam: 3.4 cm  LA/Ao: 0.94  LVOT diam: 2.0 cm  LVOT area: 3.1 cm2  Ao root diam index Ht(cm/m): 1.9  Ao root diam index BSA (cm/m2): 1.8  Asc Ao diam index BSA (cm/m2): 1.9  Asc Ao diam index Ht(cm/m): 2.0  EF Biplane: 56.2 %  LA Volume (BP): 35.0 ml     LA Volume Index (BP): 19.6 ml/m2  RWT: 0.44  TAPSE: 1.9 cm     Doppler Measurements & Calculations  Ao V2 max: 99.2 cm/sec  Ao max P.0 mmHg  Ao V2 mean: 67.3 cm/sec  Ao mean P.0 mmHg  Ao V2 VTI: 18.9 cm  ZOEY(I,D): 2.6 cm2  ZOEY(V,D): 2.9 cm2  LV V1 max PG: 3.4 mmHg  LV V1 max: 92.0 cm/sec  LV V1 VTI: 15.9 cm  SV(LVOT): 50.0 ml  SI(LVOT): 27.9  ml/m2  PA acc time: 0.19 sec  TR max roddy: 248.6 cm/sec  TR max P.7 mmHg  AV Roddy Ratio (DI): 0.93  ZOEY Index (cm2/m2): 1.5  RV S Roddy: 12.6 cm/sec     ______________________________________________________________________________  Report approved by: Orion Mccracken 2024 03:00 PM               Clinically Significant Risk Factors Present on Admission                                      Cardiac Arrhythmia: Atrial flutter: Typical  Cardiomyopathy    CKD POA List: Stage 2 (GFR 60-89)

## 2024-06-28 NOTE — ED PROVIDER NOTES
Emergency Department Note      History of Present Illness     Chief Complaint   Palpitations (Pt hx of afib. Believes he started going in/out of afib yesterday but this AM noticed consistent HR 140s. Not on thinners. Pt denies chest pain or SOB but some dizziness.)      GABRIELLE   Trevon Nice is a 68 year old male with history of paroxysmal A-fib who presents to the ED for evaluation of palpitations.  Patient states that he was feeling ill yesterday with cough, sore throat, and congestion.  This morning he woke up with similar symptoms.  His heart rate monitor picked up an elevated heart rate.  He denies any chest pain, shortness of breath, cough, nausea, vomiting, abdominal pain, dysuria.  He states that he has a history of A-fib but has not had an episode in years.  He is on flecainide twice a day.  No blood thinners.  He has been eating and drinking okay.  Denies any recent ill contacts.  No recent travel, surgeries, hospitalizations.    Independent Historian   Patient only.    Review of External Notes   Reviewed cardiology note from 8/11/2023.  History of paroxysmal A-fib.  Flecainide 50 mg twice daily and metoprolol 12.5 daily.  No anticoagulation.  No obvious A-fib episodes at the time.    Past Medical History     Medical History and Problem List   Past Medical History:   Diagnosis Date    Atrial fibrillation (H)     Hypertension     Prostatitis        Medications   No current outpatient medications on file.      Surgical History   Past Surgical History:   Procedure Laterality Date    PROSTATE SURGERY         Physical Exam     Patient Vitals for the past 24 hrs:   BP Temp Temp src Pulse Resp SpO2 Height Weight   06/28/24 1310 111/74 -- -- -- -- 99 % -- --   06/28/24 1215 103/81 98.6  F (37  C) Oral -- 16 98 % -- --   06/28/24 1045 101/70 -- -- 97 12 98 % -- --   06/28/24 1030 98/79 -- -- 105 13 98 % -- --   06/28/24 1015 91/75 -- -- 92 14 99 % -- --   06/28/24 1000 99/67 -- -- 86 16 100 % -- --   06/28/24  "0945 100/71 -- -- 82 16 98 % -- --   06/28/24 0930 95/76 -- -- 80 18 96 % -- --   06/28/24 0915 99/72 -- -- 91 14 96 % -- --   06/28/24 0900 108/77 -- -- 97 16 96 % -- --   06/28/24 0845 (!) 123/100 -- -- (!) 192 -- -- -- --   06/28/24 0830 106/71 -- -- 78 14 95 % -- --   06/28/24 0815 (!) 107/97 -- -- (!) 146 14 96 % -- --   06/28/24 0800 105/78 -- -- (!) 144 16 95 % -- --   06/28/24 0745 -- -- -- (!) 146 18 96 % -- --   06/28/24 0730 (!) 120/104 -- -- (!) 146 (!) 9 96 % -- --   06/28/24 0718 115/89 98.4  F (36.9  C) Temporal (!) 145 16 98 % 1.702 m (5' 7\") 68 kg (150 lb)   06/28/24 0717 -- -- -- -- 18 -- -- --     Physical Exam  Physical Exam:  General: lying comfortably on hospital bed  Head: normocephalic, atraumatic  Eyes: PERRLA, EOMI  Ears: External ears appear normal.   Nose: no signs of bleeding   Throat: moist mucous membranes  Neck: No JVD  CV: Tachycardic, irregular rhythm  Pulm: lungs clear to ausculation bilaterally, normal respiratory effort, normal chest expansion with breathing   Abdomen: soft, non-tender, non-distended  MSK: No trauma  Ext: normal range of motion of all extremities. No gross deformities  Skin: warm, dry, no rashes  Neuro: alert and oriented  Psych: Appropriate mood. Cooperative      Diagnostics     Lab Results   Labs Ordered and Resulted from Time of ED Arrival to Time of ED Departure   INFLUENZA A/B, RSV, & SARS-COV2 PCR - Abnormal       Result Value    Influenza A PCR Negative      Influenza B PCR Negative      RSV PCR Negative      SARS CoV2 PCR Positive (*)    COMPREHENSIVE METABOLIC PANEL - Abnormal    Sodium 137      Potassium 4.3      Carbon Dioxide (CO2) 23      Anion Gap 12      Urea Nitrogen 17.5      Creatinine 1.20 (*)     GFR Estimate 65      Calcium 9.4      Chloride 102      Glucose 105 (*)     Alkaline Phosphatase 83      AST 15      ALT 19      Protein Total 6.9      Albumin 4.3      Bilirubin Total 0.7     CBC WITH PLATELETS AND DIFFERENTIAL - Abnormal    WBC " Count 9.6      RBC Count 5.45      Hemoglobin 16.3      Hematocrit 47.5      MCV 87      MCH 29.9      MCHC 34.3      RDW 12.1      Platelet Count 236      % Neutrophils 79      % Lymphocytes 7      % Monocytes 13      % Eosinophils 1      % Basophils 0      % Immature Granulocytes 0      NRBCs per 100 WBC 0      Absolute Neutrophils 7.6      Absolute Lymphocytes 0.7 (*)     Absolute Monocytes 1.2      Absolute Eosinophils 0.1      Absolute Basophils 0.0      Absolute Immature Granulocytes 0.0      Absolute NRBCs 0.0     TROPONIN T, HIGH SENSITIVITY - Normal    Troponin T, High Sensitivity 13     INR - Normal    INR 1.11     PARTIAL THROMBOPLASTIN TIME - Normal    aPTT 32     MAGNESIUM - Normal    Magnesium 2.0     TSH WITH FREE T4 REFLEX - Normal    TSH 2.53     GROUP A STREPTOCOCCUS PCR THROAT SWAB - Normal    Group A strep by PCR Not Detected         Imaging   Echocardiogram Complete    (Results Pending)       EKG   ECG results from 06/28/24   EKG 12 lead     Value    Systolic Blood Pressure     Diastolic Blood Pressure     Ventricular Rate 147    Atrial Rate 294    WV Interval     QRS Duration 84        QTc 532    P Axis 75    R AXIS -43    T Axis 90    Interpretation ECG      ** Critical Test Result: High HR  Atrial flutter with 2:1 A-V conduction  Left axis deviation  Nonspecific ST and T wave abnormality  Abnormal ECG  When compared with ECG of 25-MAY-2018 12:42,  Significant changes have occurred             Independent Interpretation   None    ED Course      Medications Administered   Medications   diltiazem (CARDIZEM) 125 mg in sodium chloride 0.9 % 125 mL infusion (5 mg/hr Intravenous Restarted 6/28/24 1313)   heparin 25,000 units in 0.45% NaCl 250 mL ANTICOAGULANT infusion (800 Units/hr Intravenous Rate/Dose Verify 6/28/24 1058)   lidocaine 1 % 0.1-1 mL (has no administration in time range)   lidocaine (LMX4) cream (has no administration in time range)   sodium chloride (PF) 0.9% PF flush 3 mL (3  mLs Intracatheter Not Given 6/28/24 1221)   sodium chloride (PF) 0.9% PF flush 3 mL (has no administration in time range)   senna-docusate (SENOKOT-S/PERICOLACE) 8.6-50 MG per tablet 1 tablet (has no administration in time range)     Or   senna-docusate (SENOKOT-S/PERICOLACE) 8.6-50 MG per tablet 2 tablet (has no administration in time range)   calcium carbonate (TUMS) chewable tablet 1,000 mg (has no administration in time range)   Patient is already receiving anticoagulation with heparin, enoxaparin (LOVENOX), warfarin (COUMADIN)  or other anticoagulant medication (has no administration in time range)   hydrALAZINE (APRESOLINE) tablet 10 mg (has no administration in time range)     Or   hydrALAZINE (APRESOLINE) injection 10 mg (has no administration in time range)   acetaminophen (TYLENOL) tablet 650 mg (has no administration in time range)     Or   acetaminophen (TYLENOL) Suppository 650 mg (has no administration in time range)   flecainide (TAMBOCOR) tablet 50 mg (50 mg Oral $Given 6/28/24 1312)   metoprolol tartrate (LOPRESSOR) tablet 25 mg (25 mg Oral $Given 6/28/24 1312)   diltiazem (CARDIZEM) injection 15 mg (15 mg Intravenous $Given 6/28/24 0811)   heparin loading dose for LOW INTENSITY TREATMENT * Give BEFORE starting heparin infusion (4,100 Units Intravenous $Given 6/28/24 0857)   perflutren diluted 1mL to 2mL with saline (OPTISON) diluted injection 9 mL (9 mLs Intravenous $Given 6/28/24 1415)   sodium chloride (PF) 0.9% PF flush 10 mL (10 mLs Intravenous $Given 6/28/24 1416)       Procedures   Procedures     Discussion of Management   Admitting Hospitalist, KATLIN Keita.  Cardiology, Dr. Nye.    Social Determinants of Health adding to complexity of care   None    ED Course   ED Course as of 06/28/24 1433   Fri Jun 28, 2024   0737 I evaluated patient and obtained history.   0745 Dr. Burrell obtained history and examined the patient as noted above     0807 I spoke with Dr. Nye, cardiology, to discuss  my findings and subsequent plan of care    0825 Reassessed patient. Discussed admission.    0847 Discussed with Dr. Coe. Accepted patient for admission.       Medical Decision Making / Diagnosis     CMS Diagnoses: None    MIPS       None    MDM   Trevon Nice is a 68 year old male who presents to ED for evaluation of palpitations.  Vitals notable for tachycardia with heart rate in the 140s.  Patient has a history of intermittent A-fib.  He states that he has not had an episode in years.  He is on flecainide twice a day.  Yesterday, he states that he did not feel well.  He had upper respiratory symptoms including cough, congestion, sore throat.  Today, he woke up with similar symptoms.  His heart rate monitor picked up and elevated heart rate.  He states that he felt like he was in A-fib.  He denies any dizziness or lightheadedness, chest pain, shortness of breath.  No fevers.  See further HPI details above.  Broad differential was considered including A-fib/a flutter, PE, ACS, COVID, strep pharyngitis, pneumonia.  On exam, patient is well-appearing.  He is afebrile.  No hypoxia.  Lungs sound clear bilaterally.  No abdominal tenderness.  Viral panel is positive for COVID.  EKG notable for a flutter with rapid ventricular response.  No ischemic changes.  Troponin was normal.  Suspect elevated heart rate as secondary to COVID.  Dr. Burrell spoke with Dr. Nye from cardiology to discuss initiating flecainide for rhythm conversion..  Recommendation was to hold off on giving flecainide and to initiate diltiazem instead.  He is not a candidate for cardioversion given that he is anticoagulated and duration of symptoms are unknown.  We will initiate heparin and admit him into the hospital for rhythm conversion and rate control.  Vitals have been stable throughout ED stay.  Discussed with hospitalist who excepted patient for admission.    Disposition   The patient was admitted to the hospital.     Diagnosis      ICD-10-CM    1. COVID-19  U07.1       2. Atrial flutter with rapid ventricular response (H)  I48.92            Discharge Medications   Current Discharge Medication List            HUMBERTO Guerrero, HUMBERTO Mukherjee  06/28/24 1445

## 2024-06-29 VITALS
HEIGHT: 67 IN | WEIGHT: 141.76 LBS | RESPIRATION RATE: 16 BRPM | BODY MASS INDEX: 22.25 KG/M2 | DIASTOLIC BLOOD PRESSURE: 86 MMHG | SYSTOLIC BLOOD PRESSURE: 115 MMHG | OXYGEN SATURATION: 96 % | TEMPERATURE: 98.6 F | HEART RATE: 87 BPM

## 2024-06-29 LAB
ANION GAP SERPL CALCULATED.3IONS-SCNC: 9 MMOL/L (ref 7–15)
BUN SERPL-MCNC: 15.4 MG/DL (ref 8–23)
CALCIUM SERPL-MCNC: 9.6 MG/DL (ref 8.8–10.2)
CHLORIDE SERPL-SCNC: 103 MMOL/L (ref 98–107)
CREAT SERPL-MCNC: 1.28 MG/DL (ref 0.67–1.17)
DEPRECATED HCO3 PLAS-SCNC: 28 MMOL/L (ref 22–29)
EGFRCR SERPLBLD CKD-EPI 2021: 60 ML/MIN/1.73M2
ERYTHROCYTE [DISTWIDTH] IN BLOOD BY AUTOMATED COUNT: 12.1 % (ref 10–15)
GLUCOSE SERPL-MCNC: 102 MG/DL (ref 70–99)
HCT VFR BLD AUTO: 53.7 % (ref 40–53)
HGB BLD-MCNC: 18.2 G/DL (ref 13.3–17.7)
MAGNESIUM SERPL-MCNC: 2.2 MG/DL (ref 1.7–2.3)
MCH RBC QN AUTO: 30.4 PG (ref 26.5–33)
MCHC RBC AUTO-ENTMCNC: 33.9 G/DL (ref 31.5–36.5)
MCV RBC AUTO: 90 FL (ref 78–100)
PLATELET # BLD AUTO: 263 10E3/UL (ref 150–450)
POTASSIUM SERPL-SCNC: 4.8 MMOL/L (ref 3.4–5.3)
RBC # BLD AUTO: 5.99 10E6/UL (ref 4.4–5.9)
SODIUM SERPL-SCNC: 140 MMOL/L (ref 135–145)
WBC # BLD AUTO: 10.6 10E3/UL (ref 4–11)

## 2024-06-29 PROCEDURE — 80048 BASIC METABOLIC PNL TOTAL CA: CPT

## 2024-06-29 PROCEDURE — 83735 ASSAY OF MAGNESIUM: CPT | Performed by: HOSPITALIST

## 2024-06-29 PROCEDURE — 250N000013 HC RX MED GY IP 250 OP 250 PS 637: Performed by: HOSPITALIST

## 2024-06-29 PROCEDURE — 99214 OFFICE O/P EST MOD 30 MIN: CPT | Performed by: INTERNAL MEDICINE

## 2024-06-29 PROCEDURE — G0378 HOSPITAL OBSERVATION PER HR: HCPCS

## 2024-06-29 PROCEDURE — 250N000013 HC RX MED GY IP 250 OP 250 PS 637: Performed by: INTERNAL MEDICINE

## 2024-06-29 PROCEDURE — 36415 COLL VENOUS BLD VENIPUNCTURE: CPT

## 2024-06-29 PROCEDURE — 99239 HOSP IP/OBS DSCHRG MGMT >30: CPT | Performed by: HOSPITALIST

## 2024-06-29 PROCEDURE — 85027 COMPLETE CBC AUTOMATED: CPT

## 2024-06-29 RX ORDER — METOPROLOL TARTRATE 25 MG/1
25 TABLET, FILM COATED ORAL ONCE
Status: COMPLETED | OUTPATIENT
Start: 2024-06-29 | End: 2024-06-29

## 2024-06-29 RX ORDER — METOPROLOL TARTRATE 50 MG
50 TABLET ORAL 2 TIMES DAILY
Status: DISCONTINUED | OUTPATIENT
Start: 2024-06-29 | End: 2024-06-29 | Stop reason: HOSPADM

## 2024-06-29 RX ORDER — METOPROLOL TARTRATE 25 MG/1
25 TABLET, FILM COATED ORAL 2 TIMES DAILY
Status: DISCONTINUED | OUTPATIENT
Start: 2024-06-29 | End: 2024-06-29

## 2024-06-29 RX ORDER — METOPROLOL TARTRATE 50 MG
50 TABLET ORAL 2 TIMES DAILY
Qty: 60 TABLET | Refills: 0 | Status: SHIPPED | OUTPATIENT
Start: 2024-06-29 | End: 2024-07-29

## 2024-06-29 RX ADMIN — APIXABAN 5 MG: 5 TABLET, FILM COATED ORAL at 09:07

## 2024-06-29 RX ADMIN — METOPROLOL TARTRATE 25 MG: 25 TABLET, FILM COATED ORAL at 09:07

## 2024-06-29 RX ADMIN — METOPROLOL TARTRATE 25 MG: 25 TABLET, FILM COATED ORAL at 11:36

## 2024-06-29 ASSESSMENT — ACTIVITIES OF DAILY LIVING (ADL)
ADLS_ACUITY_SCORE: 20

## 2024-06-29 NOTE — UTILIZATION REVIEW
"  Admission Status; Secondary Review Determination         Under the authority of the Utilization Management Committee, the utilization review process indicated a secondary review on the above patient.  The review outcome is based on review of the medical records, discussions with staff, and applying clinical experience noted on the date of the review.          (x) Observation Status Appropriate - This patient does not meet hospital inpatient criteria and is placed in observation status. If this patient's primary payer is Medicare and was admitted as an inpatient, Condition Code 44 should be used and patient status changed to \"observation\".         RATIONALE FOR DETERMINATION   The patient is a 68-year-old male admitted to the hospital on 6/28/2024.  Patient came to the ED because of noted rapid heart rate in the 140s.  He had episodes of atrial fibrillation back 10 years and they are usually infrequent.  He also comes in with positive diagnosis for COVID.  He had been on metoprolol in the past which was discontinued.  He was started on heparin and given IV diltiazem to lower the rate.  He was also started on flecainide which was discontinued after electrophysiology cardiology consultation.  Normal TSH.  Ejection fraction of the left ventricle was reduced to 40%.  Recommendation by EP is to follow-up with [facility] cardiology as an outpatient.  The patient was started on Eliquis 5 mg twice a day and heparin is discontinued.  IV diltiazem is discontinued with oral continuing.  Possible conversion will be as an outpatient on anticoagulation.  Recommendation is to change the patient's status from inpatient to observation based on MCG criteria for atrial fibs/flutter.  Dr. Cantu will be notified of this recommendation Via text.      The severity of illness, intensity of service provided, expected LOS and risk for adverse outcome make the care complex, high risk and appropriate for hospital admission.        The " information on this document is developed by the utilization review team in order for the business office to ensure compliance.  This only denotes the appropriateness of proper admission status and does not reflect the quality of care rendered.         The definitions of Inpatient Status and Observation Status used in making the determination above are those provided in the CMS Coverage Manual, Chapter 1 and Chapter 6, section 70.4.      Sincerely,     Ole Hernandez MD  Physician Advisor  Utilization Review/ Case Management  [facility].

## 2024-06-29 NOTE — PROGRESS NOTES
Patient seen and examined    - Evaluated by EP, started on metoprolol 25 mg TID and stopped PTA Flecainide  - also started on Eliquis 5 mg BID; stopped diltiazem and IV heparin  - ECHO 6/28 noted with LVEF 40%, mildly decreased right ventricular systolic function, trace to mild tricuspid regurgitation  - Rate improved to 70s but still with flutter and EP note that rate is fast with minimal exertion  - EP increased Metoprolol to 50 mg BID and cleared for discharge home with plans to follow-up with his cardiology team at [facility]  -patient is relatively asymptomatic from COVID, afebrile, no shortness of breath or cough    Discharge home today    Please see discharge summary for details

## 2024-06-29 NOTE — PROGRESS NOTES
A&O x 4, VSS on RA, denied pain/SOB. Up an independent. Tele Aflutter with CVR. Possible discharge to home. Continue plan of care.

## 2024-06-29 NOTE — PLAN OF CARE
A&O x 4. VSS, ex elevated HR at times. Tele: Aflutter CVR/RVR. Denies pain/SOB. Up independent in room. Pt discharging to home w/spouse. Discharge instructions & medications given to pt, questions answered, no new concerns reported. Pt escorted to door six via wheelchair & hospital staff.

## 2024-06-29 NOTE — PROGRESS NOTES
"EP CARDIOLOGY PROGRESS NOTE  Kong Box MD (pager [phone])    68-year-old male with history of paroxysmal atrial fibrillation for ~10 years (follows at [facility], on chronic flecainide, no anticoagulation), CKD, dyslipidemia who presents with acute COVID infection and atrial flutter with RVR.    Echocardiography echocardiography showed decreased EF likely related to atrial flutter with RVR.. Flecainide was stopped during this admission. He was placed on metoprolol 25 mg BID and apixaban. Today his heart rate was in the 80s-90s during sleep but quickly went up to the 120s while sitting up in bed. He feels he has a viral infection but is unaware of heart racing.  -----------------------------------------------------------------    ASSESSMENT:  Atrial flutter with RVR, appears typical CTI flutter. Possibly triggered by COVID infection. Flecainide was stopped, metoprolol was started. The patient is essentially asymptomatic but the rate is fast with minimal exertion.  EF ~40% during atrial flutter.  History of paroxysmal atrial fibrillation.  COVID infection.    PLAN:  Increase metoprolol tartrate 50 mg BID.  Okay to sent home from our standpoint.  Follow-up with his cardiology team at [facility].    Total Time: 35 min;   20 minutes spent in direct communication with patient / family and care coordination.             Interval History:     He feels like he has a viral infection, unaware of any heart irregularity          Review of Systems:     CONSTITUTIONAL: NEGATIVE for fever, chills, change in weight  ENT/MOUTH: NEGATIVE for ear, mouth and throat problems  RESP: NEGATIVE for significant cough or SOB  CV: NEGATIVE for chest pain, palpitations or peripheral edema          Physical Exam:      Blood pressure 108/74, pulse 75, temperature 98.6  F (37  C), temperature source Oral, resp. rate 16, height 1.702 m (5' 7\"), weight 64.3 kg (141 lb 12.1 oz), SpO2 96%.  Vitals:    06/28/24 0718 06/28/24 1521 " "06/29/24 0700   Weight: 68 kg (150 lb) 65.5 kg (144 lb 6.4 oz) 64.3 kg (141 lb 12.1 oz)     Vital Signs with Ranges  Temp:  [98.3  F (36.8  C)-98.6  F (37  C)] 98.6  F (37  C)  Pulse:  [] 75  Resp:  [12-16] 16  BP: ()/(62-84) 108/74  SpO2:  [94 %-99 %] 96 %  I/O's Last 24 hours  I/O last 3 completed shifts:  In: 16 [I.V.:16]  Out: -     GENERAL: alert and oriented, no distress  HEENT:  normocephalic, atraumatic.  NECK:  normal JVP.  LUNGS:  clear bilaterally, no wheezes  CARDIOVASCULAR: irregular rhythm in the low 100s, no gallop, murmur or rub  EXTREMITIES:  no edema  VASCULAR:  2+ carotids           Medications:        Current Facility-Administered Medications   Medication Dose Route Frequency Provider Last Rate Last Admin    apixaban ANTICOAGULANT (ELIQUIS) tablet 5 mg  5 mg Oral BID Jeromy Pina MD   5 mg at 06/29/24 0907    metoprolol tartrate (LOPRESSOR) tablet 25 mg  25 mg Oral Once Dominic Box MD        metoprolol tartrate (LOPRESSOR) tablet 50 mg  50 mg Oral BID Dominic Box MD        sodium chloride (PF) 0.9% PF flush 3 mL  3 mL Intracatheter Q8H Hernandez Keita NP   3 mL at 06/29/24 0915            Data:      All new lab and imaging data was reviewed.   Recent Labs   Lab Test 06/29/24  0632 06/28/24  0744 06/28/24  0740 05/25/18  1145   WBC 10.6 9.6  --  16.6*   HGB 18.2* 16.3  --  14.9   MCV 90 87  --  88    236  --  193   INR  --   --  1.11  --       Recent Labs   Lab Test 06/29/24  0632 06/28/24  0740 05/25/18  1145    137 136   POTASSIUM 4.8 4.3 4.1   CHLORIDE 103 102 104   CO2 28 23 26   BUN 15.4 17.5 24   CR 1.28* 1.20* 1.45*   ANIONGAP 9 12 6   ANGELA 9.6 9.4 8.6   * 105* 111*     No lab results found.    Invalid input(s): \"TROP\", \"TROPONINIES\"      EKG results:  Reviewed    Imaging:   Recent Results (from the past 24 hour(s))   Echocardiogram Complete   Result Value    LVEF  40%    Narrative    " [id number]  [id number]  [id number]^YANG^FLASH^RAINER     [facility]  Echocardiography Laboratory  [address] [facility] [place] [address]     Name: WALT CORTEZ  MRN: [id number]  : 1955  Study Date: 2024 01:56 PM  Age: 68 yrs  Gender: Male  Patient Location: [facility]  Reason For Study: Aflutter  Ordering Physician: FLASH SORIA  Referring Physician: Sienna La  Performed By: Liz Her     BSA: 1.8 m2  Height: 67 in  Weight: 150 lb  HR: 75  BP: 120/104 mmHg  ______________________________________________________________________________  Procedure  Complete Portable Echo Adult. Optison (NDC #[id number]) given intravenously.  ______________________________________________________________________________  Interpretation Summary     The visual ejection fraction is estimated at 40%.  Mildly decreased right ventricular systolic function  There is trace to mild tricuspid regurgitation.  The rhythm was atrial flutter.  The study was technically difficult. Optison contrast was used without  apparent complications. There is no comparison study available.  ______________________________________________________________________________  Left Ventricle  The left ventricle is normal in size. There is normal left ventricular wall  thickness. The visual ejection fraction is estimated at 40%. There is mild-  moderate global hypokinesia of the left ventricle.     Right Ventricle  The right ventricle is normal size. Mildly decreased right ventricular  systolic function.     Atria  Normal left atrial size. Right atrial size is normal. There is no color  Doppler evidence of an atrial shunt.     Mitral Valve  The mitral valve is normal in structure and function. There is no mitral  regurgitation noted.     Tricuspid Valve  The tricuspid valve is normal in structure and function. There is trace to  mild tricuspid regurgitation. The right ventricular systolic pressure  is  approximated at 24.7 mmHg plus the right atrial pressure.     Aortic Valve  There is trivial trileaflet aortic sclerosis. No aortic regurgitation is  present.     Pulmonic Valve  The pulmonic valve is not well visualized.     Vessels  Normal size aorta. Normal size ascending aorta.     Pericardium  There is no pericardial effusion.     Rhythm  The rhythm was atrial flutter.  ______________________________________________________________________________  MMode/2D Measurements & Calculations  IVSd: 0.80 cm     LVIDd: 4.1 cm  LVIDs: 3.3 cm  LVPWd: 0.90 cm  FS: 18.9 %  LV mass(C)d: 105.6 grams  LV mass(C)dI: 59.0 grams/m2  Ao root diam: 3.3 cm  LA dimension: 3.1 cm  asc Aorta Diam: 3.4 cm  LA/Ao: 0.94  LVOT diam: 2.0 cm  LVOT area: 3.1 cm2  Ao root diam index Ht(cm/m): 1.9  Ao root diam index BSA (cm/m2): 1.8  Asc Ao diam index BSA (cm/m2): 1.9  Asc Ao diam index Ht(cm/m): 2.0  EF Biplane: 56.2 %  LA Volume (BP): 35.0 ml     LA Volume Index (BP): 19.6 ml/m2  RWT: 0.44  TAPSE: 1.9 cm     Doppler Measurements & Calculations  Ao V2 max: 99.2 cm/sec  Ao max P.0 mmHg  Ao V2 mean: 67.3 cm/sec  Ao mean P.0 mmHg  Ao V2 VTI: 18.9 cm  ZOEY(I,D): 2.6 cm2  ZOEY(V,D): 2.9 cm2  LV V1 max PG: 3.4 mmHg  LV V1 max: 92.0 cm/sec  LV V1 VTI: 15.9 cm  SV(LVOT): 50.0 ml  SI(LVOT): 27.9 ml/m2  PA acc time: 0.19 sec  TR max roddy: 248.6 cm/sec  TR max P.7 mmHg  AV Roddy Ratio (DI): 0.93  ZOEY Index (cm2/m2): 1.5  RV S Roddy: 12.6 cm/sec     ______________________________________________________________________________  Report approved by: Orion Mccracken 2024 03:00 PM

## 2024-06-29 NOTE — DISCHARGE SUMMARY
[facility]  Discharge Summary        Trevon Nice MRN# [id number]   YOB: 1955 Age: 68 year old     Date of Admission:  6/28/2024  Date of Discharge:  6/29/2024  Admitting Physician:  Hernandez Keita NP  Discharge Physician: Ad Cantu MD  Discharging Service: Hospitalist     Primary Provider: Sienna La  Primary Care Physician Phone Number: [phone]         Discharge Diagnoses/Problem Oriented Hospital Course (Providers):    Trevon Nice was admitted on 6/28/2024 by Hernandez Keita NP and I would refer you to their history and physical.  The following problems were addressed during his hospitalization:    Trevon Nice is a 68 year old male with PMH of atrial fibrillation (not on anticoagulant PTA) who presented to ED on 6/28/2024 with palpitations, as well as runny nose, sore throat, muscle aches.      Atrial Flutter with Rapid Ventricular Response  - rate upto 140s; EKG noted atrial flutter  - he was started on diltiazem and heparin drip   - Evaluated by EP, started on metoprolol 25 mg TID and stopped PTA Flecainide  - also started on Eliquis 5 mg BID; stopped diltiazem and IV heparin  - ECHO 6/28 noted with LVEF 40%, mildly decreased right ventricular systolic function, trace to mild tricuspid regurgitation  - Rate improved to 70s but still with flutter and EP note that rate is fast with minimal exertion  - EP increased Metoprolol to 50 mg BID and cleared for discharge home with plans to follow-up with his cardiology team at [facility]     COVID 19 Infection  He reported runny nose, sore throat and myalgias starting the evening of 6/27. He has been afebrile, denies cough shortness of breath or any other potentially related symptoms. Not requiring supplemental oxygen.  *Last COVID vaccine 11/21/23  *SARS CoV2 PCR positive  *Influenza A, B and RSV negative  *WBC 9.6  -clinically stable; saturating well on room  air    Clinically Significant Risk Factors                                                         Brief Hospital Stay Summary Sent Home With Patient in AVS:        Reason for your hospital stay      You were admitted for evaluation and management of atrial flutter with   rapid heart rate in the setting of mild COVID infection.                Pending Results:        Unresulted Labs Ordered in the Past 30 Days of this Admission       No orders found for last 31 day(s).              Discharge Instructions and Follow-Up:      Follow-up Appointments     Follow-up and recommended labs and tests       Follow up with primary care provider, Sienna La, within 7 days for   hospital follow- up.  The following labs/tests are recommended: repeat CBC   and BMP.    Follow-up with your cardiologist at [facility] in 1 to 2 weeks or as   suggested.                Discharge Disposition:      Discharged to home        Discharge Medications:        Current Discharge Medication List        START taking these medications    Details   apixaban ANTICOAGULANT (ELIQUIS) 5 MG tablet Take 1 tablet (5 mg) by mouth 2 times daily for 30 days  Qty: 60 tablet, Refills: 0    Comments: Future refills by PCP Dr. Sienna La with phone number [phone].  Associated Diagnoses: Atrial flutter with rapid ventricular response (H)      metoprolol tartrate (LOPRESSOR) 50 MG tablet Take 1 tablet (50 mg) by mouth 2 times daily for 30 days  Qty: 60 tablet, Refills: 0    Comments: Future refills by PCP Dr. Sienna La with phone number [phone].  Associated Diagnoses: Atrial flutter with rapid ventricular response (H)           STOP taking these medications       Flecainide Acetate (TAMBOCOR PO) Comments:   Reason for Stopping:                 Allergies:       No Known Allergies        Consultations This Hospital Stay:      Consultation during this admission received from electrophysiology        Condition and Physical on Discharge:        Discharge  "condition: Stable   Vitals: Blood pressure 108/74, pulse 75, temperature 98.6  F (37  C), temperature source Oral, resp. rate 16, height 1.702 m (5' 7\"), weight 64.3 kg (141 lb 12.1 oz), SpO2 96%.     Constitutional: Alert, awake and orienetd X 3; lying comfortably in bed in no apparent distress   HEENT: Pupils equal and reactive to light and accomodation, EOMI intact; neck supple no raised JVD or rigidity    Oral cavity: Moist mucosa   Cardiovascular: Normal s1 s2, irregularly irregular rate and rhythm, no murmur   Lungs: B/l clear to auscultation, no wheezes or crepitations   Abdomen: Soft, nt, nd, no guarding, rigidity or rebound; BS +   LE : No edema   Musculoskeletal: Power 5/5 in all extremities   Neuro: No focal neurological deficits noted, CN II to XII grossly intact   Psychiatry: normal mood and affect             Discharge Time:      Greater than 30 minutes.        Image Results From This Hospital Stay (For Non-EPIC Providers):        Results for orders placed or performed during the hospital encounter of 24   Echocardiogram Complete     Value    LVEF  40%    Narrative    [id number]  [id number]  [id number]  [id number]^YANG^FLASH^RAINER     [facility]  Echocardiography Laboratory  [address] [place] [address]     Name: WALT CORTEZ  MRN: [id number]  : 1955  Study Date: 2024 01:56 PM  Age: 68 yrs  Gender: Male  Patient Location: [facility]  Reason For Study: Aflutter  Ordering Physician: FLASH SORIA  Referring Physician: Sienna La  Performed By: Liz Her     BSA: 1.8 m2  Height: 67 in  Weight: 150 lb  HR: 75  BP: 120/104 mmHg  ______________________________________________________________________________  Procedure  Complete Portable Echo Adult. Optison (NDC #[id number]) given intravenously.  ______________________________________________________________________________  Interpretation Summary     The visual ejection fraction is " estimated at 40%.  Mildly decreased right ventricular systolic function  There is trace to mild tricuspid regurgitation.  The rhythm was atrial flutter.  The study was technically difficult. Optison contrast was used without  apparent complications. There is no comparison study available.  ______________________________________________________________________________  Left Ventricle  The left ventricle is normal in size. There is normal left ventricular wall  thickness. The visual ejection fraction is estimated at 40%. There is mild-  moderate global hypokinesia of the left ventricle.     Right Ventricle  The right ventricle is normal size. Mildly decreased right ventricular  systolic function.     Atria  Normal left atrial size. Right atrial size is normal. There is no color  Doppler evidence of an atrial shunt.     Mitral Valve  The mitral valve is normal in structure and function. There is no mitral  regurgitation noted.     Tricuspid Valve  The tricuspid valve is normal in structure and function. There is trace to  mild tricuspid regurgitation. The right ventricular systolic pressure is  approximated at 24.7 mmHg plus the right atrial pressure.     Aortic Valve  There is trivial trileaflet aortic sclerosis. No aortic regurgitation is  present.     Pulmonic Valve  The pulmonic valve is not well visualized.     Vessels  Normal size aorta. Normal size ascending aorta.     Pericardium  There is no pericardial effusion.     Rhythm  The rhythm was atrial flutter.  ______________________________________________________________________________  MMode/2D Measurements & Calculations  IVSd: 0.80 cm     LVIDd: 4.1 cm  LVIDs: 3.3 cm  LVPWd: 0.90 cm  FS: 18.9 %  LV mass(C)d: 105.6 grams  LV mass(C)dI: 59.0 grams/m2  Ao root diam: 3.3 cm  LA dimension: 3.1 cm  asc Aorta Diam: 3.4 cm  LA/Ao: 0.94  LVOT diam: 2.0 cm  LVOT area: 3.1 cm2  Ao root diam index Ht(cm/m): 1.9  Ao root diam index BSA (cm/m2): 1.8  Asc Ao diam index BSA  "(cm/m2): 1.9  Asc Ao diam index Ht(cm/m): 2.0  EF Biplane: 56.2 %  LA Volume (BP): 35.0 ml     LA Volume Index (BP): 19.6 ml/m2  RWT: 0.44  TAPSE: 1.9 cm     Doppler Measurements & Calculations  Ao V2 max: 99.2 cm/sec  Ao max P.0 mmHg  Ao V2 mean: 67.3 cm/sec  Ao mean P.0 mmHg  Ao V2 VTI: 18.9 cm  ZOEY(I,D): 2.6 cm2  ZOEY(V,D): 2.9 cm2  LV V1 max PG: 3.4 mmHg  LV V1 max: 92.0 cm/sec  LV V1 VTI: 15.9 cm  SV(LVOT): 50.0 ml  SI(LVOT): 27.9 ml/m2  PA acc time: 0.19 sec  TR max roddy: 248.6 cm/sec  TR max P.7 mmHg  AV Roddy Ratio (DI): 0.93  ZOEY Index (cm2/m2): 1.5  RV S Roddy: 12.6 cm/sec     ______________________________________________________________________________  Report approved by: Orion Mccracken 2024 03:00 PM                 Most Recent Lab Results In EPIC (For Non-EPIC Providers):    Most Recent 3 CBC's:  Recent Labs   Lab Test 24  0632 24  0744 18  1145   WBC 10.6 9.6 16.6*   HGB 18.2* 16.3 14.9   MCV 90 87 88    236 193      Most Recent 3 BMP's:  Recent Labs   Lab Test 24  0632 24  0740 18  1145    137 136   POTASSIUM 4.8 4.3 4.1   CHLORIDE 103 102 104   CO2 28 23 26   BUN 15.4 17.5 24   CR 1.28* 1.20* 1.45*   ANIONGAP 9 12 6   ANGELA 9.6 9.4 8.6   * 105* 111*     Most Recent 3 Troponin's:No lab results found.    Invalid input(s): \"TROP\", \"TROPONINIES\"  Most Recent 3 INR's:  Recent Labs   Lab Test 24  0740   INR 1.11     Most Recent 2 LFT's:  Recent Labs   Lab Test 24  0740   AST 15   ALT 19   ALKPHOS 83   BILITOTAL 0.7     Most Recent Cholesterol Panel:No lab results found.  Most Recent 6 Bacteria Isolates From Any Culture (See EPIC Reports for Culture Details):No lab results found.  Most Recent TSH, T4 and HgbA1c:   Recent Labs   Lab Test 24  0740   TSH 2.53               "